# Patient Record
Sex: MALE | Race: WHITE | Employment: OTHER | URBAN - METROPOLITAN AREA
[De-identification: names, ages, dates, MRNs, and addresses within clinical notes are randomized per-mention and may not be internally consistent; named-entity substitution may affect disease eponyms.]

---

## 2018-12-18 ENCOUNTER — OFFICE VISIT (OUTPATIENT)
Dept: INTERNAL MEDICINE CLINIC | Age: 63
End: 2018-12-18
Payer: COMMERCIAL

## 2018-12-18 VITALS
HEART RATE: 72 BPM | WEIGHT: 282 LBS | BODY MASS INDEX: 37.21 KG/M2 | SYSTOLIC BLOOD PRESSURE: 112 MMHG | DIASTOLIC BLOOD PRESSURE: 76 MMHG | OXYGEN SATURATION: 98 %

## 2018-12-18 DIAGNOSIS — K57.30 DIVERTICULOSIS OF LARGE INTESTINE WITHOUT HEMORRHAGE: ICD-10-CM

## 2018-12-18 DIAGNOSIS — Z00.00 ANNUAL PHYSICAL EXAM: Primary | ICD-10-CM

## 2018-12-18 DIAGNOSIS — Z98.84 OBESITY SURGERY STATUS: ICD-10-CM

## 2018-12-18 PROCEDURE — 81002 URINALYSIS NONAUTO W/O SCOPE: CPT | Performed by: INTERNAL MEDICINE

## 2018-12-18 PROCEDURE — 99396 PREV VISIT EST AGE 40-64: CPT | Performed by: INTERNAL MEDICINE

## 2018-12-18 ASSESSMENT — ENCOUNTER SYMPTOMS
EYES NEGATIVE: 1
GASTROINTESTINAL NEGATIVE: 1
RESPIRATORY NEGATIVE: 1

## 2018-12-18 ASSESSMENT — PATIENT HEALTH QUESTIONNAIRE - PHQ9
SUM OF ALL RESPONSES TO PHQ QUESTIONS 1-9: 0
2. FEELING DOWN, DEPRESSED OR HOPELESS: 0
SUM OF ALL RESPONSES TO PHQ QUESTIONS 1-9: 0
1. LITTLE INTEREST OR PLEASURE IN DOING THINGS: 0
DEPRESSION UNABLE TO ASSESS: FUNCTIONAL CAPACITY MOTIVATION LIMITS ACCURACY
SUM OF ALL RESPONSES TO PHQ9 QUESTIONS 1 & 2: 0

## 2018-12-19 ENCOUNTER — NURSE ONLY (OUTPATIENT)
Dept: INTERNAL MEDICINE CLINIC | Age: 63
End: 2018-12-19
Payer: COMMERCIAL

## 2018-12-19 DIAGNOSIS — Z00.00 ANNUAL PHYSICAL EXAM: ICD-10-CM

## 2018-12-19 LAB
A/G RATIO: 1.6 (ref 1.1–2.2)
ALBUMIN SERPL-MCNC: 4.2 G/DL (ref 3.4–5)
ALP BLD-CCNC: 89 U/L (ref 40–129)
ALT SERPL-CCNC: 15 U/L (ref 10–40)
ANION GAP SERPL CALCULATED.3IONS-SCNC: 10 MMOL/L (ref 3–16)
AST SERPL-CCNC: 20 U/L (ref 15–37)
BASOPHILS ABSOLUTE: 0.1 K/UL (ref 0–0.2)
BASOPHILS RELATIVE PERCENT: 1.9 %
BILIRUB SERPL-MCNC: 0.7 MG/DL (ref 0–1)
BUN BLDV-MCNC: 10 MG/DL (ref 7–20)
CALCIUM SERPL-MCNC: 9.5 MG/DL (ref 8.3–10.6)
CHLORIDE BLD-SCNC: 107 MMOL/L (ref 99–110)
CHOLESTEROL, TOTAL: 181 MG/DL (ref 0–199)
CO2: 26 MMOL/L (ref 21–32)
CREAT SERPL-MCNC: 0.8 MG/DL (ref 0.8–1.3)
EOSINOPHILS ABSOLUTE: 0.2 K/UL (ref 0–0.6)
EOSINOPHILS RELATIVE PERCENT: 3.8 %
GFR AFRICAN AMERICAN: >60
GFR NON-AFRICAN AMERICAN: >60
GLOBULIN: 2.6 G/DL
GLUCOSE BLD-MCNC: 89 MG/DL (ref 70–99)
HCT VFR BLD CALC: 44 % (ref 40.5–52.5)
HDLC SERPL-MCNC: 50 MG/DL (ref 40–60)
HEMOGLOBIN: 14.9 G/DL (ref 13.5–17.5)
LDL CHOLESTEROL CALCULATED: 118 MG/DL
LYMPHOCYTES ABSOLUTE: 1.8 K/UL (ref 1–5.1)
LYMPHOCYTES RELATIVE PERCENT: 34.9 %
MCH RBC QN AUTO: 32 PG (ref 26–34)
MCHC RBC AUTO-ENTMCNC: 33.9 G/DL (ref 31–36)
MCV RBC AUTO: 94.4 FL (ref 80–100)
MONOCYTES ABSOLUTE: 0.5 K/UL (ref 0–1.3)
MONOCYTES RELATIVE PERCENT: 9.9 %
NEUTROPHILS ABSOLUTE: 2.6 K/UL (ref 1.7–7.7)
NEUTROPHILS RELATIVE PERCENT: 49.5 %
PDW BLD-RTO: 13.3 % (ref 12.4–15.4)
PLATELET # BLD: 239 K/UL (ref 135–450)
PMV BLD AUTO: 7.3 FL (ref 5–10.5)
POTASSIUM SERPL-SCNC: 4.4 MMOL/L (ref 3.5–5.1)
PROSTATE SPECIFIC ANTIGEN: 1.15 NG/ML (ref 0–4)
RBC # BLD: 4.66 M/UL (ref 4.2–5.9)
SODIUM BLD-SCNC: 143 MMOL/L (ref 136–145)
TOTAL PROTEIN: 6.8 G/DL (ref 6.4–8.2)
TRIGL SERPL-MCNC: 67 MG/DL (ref 0–150)
VLDLC SERPL CALC-MCNC: 13 MG/DL
WBC # BLD: 5.2 K/UL (ref 4–11)

## 2018-12-19 PROCEDURE — 36415 COLL VENOUS BLD VENIPUNCTURE: CPT | Performed by: INTERNAL MEDICINE

## 2020-01-20 ENCOUNTER — OFFICE VISIT (OUTPATIENT)
Dept: INTERNAL MEDICINE CLINIC | Age: 65
End: 2020-01-20
Payer: MEDICARE

## 2020-01-20 VITALS
RESPIRATION RATE: 18 BRPM | HEART RATE: 76 BPM | DIASTOLIC BLOOD PRESSURE: 78 MMHG | TEMPERATURE: 98.1 F | OXYGEN SATURATION: 99 % | WEIGHT: 265 LBS | HEIGHT: 73 IN | BODY MASS INDEX: 35.12 KG/M2 | SYSTOLIC BLOOD PRESSURE: 122 MMHG

## 2020-01-20 LAB
A/G RATIO: 1.6 (ref 1.1–2.2)
ALBUMIN SERPL-MCNC: 4.5 G/DL (ref 3.4–5)
ALP BLD-CCNC: 85 U/L (ref 40–129)
ALT SERPL-CCNC: 18 U/L (ref 10–40)
ANION GAP SERPL CALCULATED.3IONS-SCNC: 14 MMOL/L (ref 3–16)
AST SERPL-CCNC: 22 U/L (ref 15–37)
BASOPHILS ABSOLUTE: 0.1 K/UL (ref 0–0.2)
BASOPHILS RELATIVE PERCENT: 2.2 %
BILIRUB SERPL-MCNC: 0.8 MG/DL (ref 0–1)
BUN BLDV-MCNC: 12 MG/DL (ref 7–20)
CALCIUM SERPL-MCNC: 9.8 MG/DL (ref 8.3–10.6)
CHLORIDE BLD-SCNC: 104 MMOL/L (ref 99–110)
CHOLESTEROL, TOTAL: 192 MG/DL (ref 0–199)
CO2: 23 MMOL/L (ref 21–32)
CREAT SERPL-MCNC: 0.8 MG/DL (ref 0.8–1.3)
EOSINOPHILS ABSOLUTE: 0.2 K/UL (ref 0–0.6)
EOSINOPHILS RELATIVE PERCENT: 3.7 %
GFR AFRICAN AMERICAN: >60
GFR NON-AFRICAN AMERICAN: >60
GLOBULIN: 2.8 G/DL
GLUCOSE BLD-MCNC: 93 MG/DL (ref 70–99)
HCT VFR BLD CALC: 43.9 % (ref 40.5–52.5)
HDLC SERPL-MCNC: 58 MG/DL (ref 40–60)
HEMOGLOBIN: 15 G/DL (ref 13.5–17.5)
LDL CHOLESTEROL CALCULATED: 120 MG/DL
LYMPHOCYTES ABSOLUTE: 1.6 K/UL (ref 1–5.1)
LYMPHOCYTES RELATIVE PERCENT: 32.6 %
MCH RBC QN AUTO: 33 PG (ref 26–34)
MCHC RBC AUTO-ENTMCNC: 34.2 G/DL (ref 31–36)
MCV RBC AUTO: 96.4 FL (ref 80–100)
MONOCYTES ABSOLUTE: 0.4 K/UL (ref 0–1.3)
MONOCYTES RELATIVE PERCENT: 8.8 %
NEUTROPHILS ABSOLUTE: 2.6 K/UL (ref 1.7–7.7)
NEUTROPHILS RELATIVE PERCENT: 52.7 %
PDW BLD-RTO: 13.3 % (ref 12.4–15.4)
PLATELET # BLD: 246 K/UL (ref 135–450)
PMV BLD AUTO: 7.4 FL (ref 5–10.5)
POTASSIUM SERPL-SCNC: 4.9 MMOL/L (ref 3.5–5.1)
PROSTATE SPECIFIC ANTIGEN: 1.49 NG/ML (ref 0–4)
RBC # BLD: 4.56 M/UL (ref 4.2–5.9)
SODIUM BLD-SCNC: 141 MMOL/L (ref 136–145)
TOTAL PROTEIN: 7.3 G/DL (ref 6.4–8.2)
TRIGL SERPL-MCNC: 70 MG/DL (ref 0–150)
VLDLC SERPL CALC-MCNC: 14 MG/DL
WBC # BLD: 5 K/UL (ref 4–11)

## 2020-01-20 PROCEDURE — 3017F COLORECTAL CA SCREEN DOC REV: CPT | Performed by: INTERNAL MEDICINE

## 2020-01-20 PROCEDURE — G8427 DOCREV CUR MEDS BY ELIG CLIN: HCPCS | Performed by: INTERNAL MEDICINE

## 2020-01-20 PROCEDURE — 99214 OFFICE O/P EST MOD 30 MIN: CPT | Performed by: INTERNAL MEDICINE

## 2020-01-20 PROCEDURE — G8417 CALC BMI ABV UP PARAM F/U: HCPCS | Performed by: INTERNAL MEDICINE

## 2020-01-20 PROCEDURE — G8484 FLU IMMUNIZE NO ADMIN: HCPCS | Performed by: INTERNAL MEDICINE

## 2020-01-20 PROCEDURE — 4040F PNEUMOC VAC/ADMIN/RCVD: CPT | Performed by: INTERNAL MEDICINE

## 2020-01-20 PROCEDURE — 1123F ACP DISCUSS/DSCN MKR DOCD: CPT | Performed by: INTERNAL MEDICINE

## 2020-01-20 PROCEDURE — 1036F TOBACCO NON-USER: CPT | Performed by: INTERNAL MEDICINE

## 2020-01-20 PROCEDURE — 36415 COLL VENOUS BLD VENIPUNCTURE: CPT | Performed by: INTERNAL MEDICINE

## 2020-01-20 SDOH — ECONOMIC STABILITY: FOOD INSECURITY: WITHIN THE PAST 12 MONTHS, THE FOOD YOU BOUGHT JUST DIDN'T LAST AND YOU DIDN'T HAVE MONEY TO GET MORE.: NEVER TRUE

## 2020-01-20 SDOH — ECONOMIC STABILITY: TRANSPORTATION INSECURITY
IN THE PAST 12 MONTHS, HAS THE LACK OF TRANSPORTATION KEPT YOU FROM MEDICAL APPOINTMENTS OR FROM GETTING MEDICATIONS?: NO

## 2020-01-20 SDOH — ECONOMIC STABILITY: TRANSPORTATION INSECURITY
IN THE PAST 12 MONTHS, HAS LACK OF TRANSPORTATION KEPT YOU FROM MEETINGS, WORK, OR FROM GETTING THINGS NEEDED FOR DAILY LIVING?: NO

## 2020-01-20 SDOH — ECONOMIC STABILITY: FOOD INSECURITY: WITHIN THE PAST 12 MONTHS, YOU WORRIED THAT YOUR FOOD WOULD RUN OUT BEFORE YOU GOT MONEY TO BUY MORE.: NEVER TRUE

## 2020-01-20 SDOH — ECONOMIC STABILITY: INCOME INSECURITY: HOW HARD IS IT FOR YOU TO PAY FOR THE VERY BASICS LIKE FOOD, HOUSING, MEDICAL CARE, AND HEATING?: NOT HARD AT ALL

## 2020-01-20 ASSESSMENT — ENCOUNTER SYMPTOMS
RESPIRATORY NEGATIVE: 1
GASTROINTESTINAL NEGATIVE: 1

## 2020-01-20 ASSESSMENT — PATIENT HEALTH QUESTIONNAIRE - PHQ9
SUM OF ALL RESPONSES TO PHQ QUESTIONS 1-9: 0
SUM OF ALL RESPONSES TO PHQ9 QUESTIONS 1 & 2: 0
SUM OF ALL RESPONSES TO PHQ QUESTIONS 1-9: 0
2. FEELING DOWN, DEPRESSED OR HOPELESS: 0
1. LITTLE INTEREST OR PLEASURE IN DOING THINGS: 0

## 2020-01-20 NOTE — PROGRESS NOTES
Subjective:      Patient ID: Dipesh Tariq is a 71 y.o. male. HPI   Gertrudis Schwartz is here for an annual exam. Overall he is feeling well. Past Surgical History:   Procedure Laterality Date    GASTRIC RESTRICTION SURGERY  5/12    sleeve       No current outpatient medications on file. No current facility-administered medications for this visit. Social History     Socioeconomic History    Marital status:      Spouse name: Not on file    Number of children: Not on file    Years of education: Not on file    Highest education level: Not on file   Occupational History    Not on file   Social Needs    Financial resource strain: Not hard at all   Meldium insecurity:     Worry: Never true     Inability: Never true   [place]itesprite needs:     Medical: No     Non-medical: No   Tobacco Use    Smoking status: Never Smoker    Smokeless tobacco: Never Used   Substance and Sexual Activity    Alcohol use: Not on file    Drug use: Not on file    Sexual activity: Not on file   Lifestyle    Physical activity:     Days per week: Not on file     Minutes per session: Not on file    Stress: Not on file   Relationships    Social connections:     Talks on phone: Not on file     Gets together: Not on file     Attends Muslim service: Not on file     Active member of club or organization: Not on file     Attends meetings of clubs or organizations: Not on file     Relationship status: Not on file    Intimate partner violence:     Fear of current or ex partner: Not on file     Emotionally abused: Not on file     Physically abused: Not on file     Forced sexual activity: Not on file   Other Topics Concern    Not on file   Social History Narrative    Not on file       Family History   Problem Relation Age of Onset    Other Mother         MVA    Heart Failure Father     Other Brother         Prostate cancer       Review of Systems   Constitutional: Negative. HENT: Negative.     Eyes: Negative for

## 2020-07-09 ENCOUNTER — OFFICE VISIT (OUTPATIENT)
Dept: PRIMARY CARE CLINIC | Age: 65
End: 2020-07-09
Payer: MEDICARE

## 2020-07-09 PROCEDURE — G8417 CALC BMI ABV UP PARAM F/U: HCPCS | Performed by: NURSE PRACTITIONER

## 2020-07-09 PROCEDURE — 99211 OFF/OP EST MAY X REQ PHY/QHP: CPT | Performed by: NURSE PRACTITIONER

## 2020-07-09 PROCEDURE — G8428 CUR MEDS NOT DOCUMENT: HCPCS | Performed by: NURSE PRACTITIONER

## 2020-07-09 NOTE — PROGRESS NOTES
Karina Monroe received a viral test for COVID-19. They were educated on isolation and quarantine as appropriate. For any symptoms, they were directed to seek care from their PCP, given contact information to establish with a doctor, directed to an urgent care or the emergency room.

## 2020-07-13 LAB — SARS-COV-2: NOT DETECTED

## 2021-02-16 ENCOUNTER — OFFICE VISIT (OUTPATIENT)
Dept: INTERNAL MEDICINE CLINIC | Age: 66
End: 2021-02-16
Payer: MEDICARE

## 2021-02-16 VITALS
SYSTOLIC BLOOD PRESSURE: 134 MMHG | OXYGEN SATURATION: 98 % | HEIGHT: 72 IN | HEART RATE: 81 BPM | WEIGHT: 281 LBS | TEMPERATURE: 98.1 F | BODY MASS INDEX: 38.06 KG/M2 | RESPIRATION RATE: 12 BRPM | DIASTOLIC BLOOD PRESSURE: 84 MMHG

## 2021-02-16 DIAGNOSIS — Z13.29 SCREENING FOR HYPOTHYROIDISM: ICD-10-CM

## 2021-02-16 DIAGNOSIS — Z12.5 SCREENING PSA (PROSTATE SPECIFIC ANTIGEN): ICD-10-CM

## 2021-02-16 DIAGNOSIS — Z13.220 SCREENING FOR HYPERLIPIDEMIA: ICD-10-CM

## 2021-02-16 DIAGNOSIS — Z13.1 SCREENING FOR DIABETES MELLITUS: ICD-10-CM

## 2021-02-16 DIAGNOSIS — Z76.89 ENCOUNTER TO ESTABLISH CARE: Primary | ICD-10-CM

## 2021-02-16 DIAGNOSIS — R03.0 ELEVATED BLOOD PRESSURE READING: ICD-10-CM

## 2021-02-16 DIAGNOSIS — E66.09 CLASS 2 OBESITY DUE TO EXCESS CALORIES WITHOUT SERIOUS COMORBIDITY WITH BODY MASS INDEX (BMI) OF 35.0 TO 35.9 IN ADULT: ICD-10-CM

## 2021-02-16 DIAGNOSIS — Z00.00 ROUTINE GENERAL MEDICAL EXAMINATION AT A HEALTH CARE FACILITY: Primary | ICD-10-CM

## 2021-02-16 DIAGNOSIS — Z12.11 SCREENING FOR COLON CANCER: ICD-10-CM

## 2021-02-16 PROBLEM — E66.812 CLASS 2 OBESITY DUE TO EXCESS CALORIES WITHOUT SERIOUS COMORBIDITY IN ADULT: Status: ACTIVE | Noted: 2021-02-16

## 2021-02-16 PROCEDURE — G8484 FLU IMMUNIZE NO ADMIN: HCPCS | Performed by: INTERNAL MEDICINE

## 2021-02-16 PROCEDURE — 4040F PNEUMOC VAC/ADMIN/RCVD: CPT | Performed by: INTERNAL MEDICINE

## 2021-02-16 PROCEDURE — 1123F ACP DISCUSS/DSCN MKR DOCD: CPT | Performed by: INTERNAL MEDICINE

## 2021-02-16 PROCEDURE — 1036F TOBACCO NON-USER: CPT | Performed by: INTERNAL MEDICINE

## 2021-02-16 PROCEDURE — G8427 DOCREV CUR MEDS BY ELIG CLIN: HCPCS | Performed by: INTERNAL MEDICINE

## 2021-02-16 PROCEDURE — G0438 PPPS, INITIAL VISIT: HCPCS | Performed by: INTERNAL MEDICINE

## 2021-02-16 PROCEDURE — G8417 CALC BMI ABV UP PARAM F/U: HCPCS | Performed by: INTERNAL MEDICINE

## 2021-02-16 PROCEDURE — 99214 OFFICE O/P EST MOD 30 MIN: CPT | Performed by: INTERNAL MEDICINE

## 2021-02-16 PROCEDURE — 3017F COLORECTAL CA SCREEN DOC REV: CPT | Performed by: INTERNAL MEDICINE

## 2021-02-16 ASSESSMENT — ENCOUNTER SYMPTOMS
SORE THROAT: 0
BACK PAIN: 0
COUGH: 0
BLOOD IN STOOL: 0
VOMITING: 0
WHEEZING: 0
CHEST TIGHTNESS: 0
ABDOMINAL PAIN: 0
SHORTNESS OF BREATH: 0
COLOR CHANGE: 0
EYE DISCHARGE: 0
NAUSEA: 0
DIARRHEA: 0
ABDOMINAL DISTENTION: 0
CONSTIPATION: 0

## 2021-02-16 ASSESSMENT — LIFESTYLE VARIABLES
HOW MANY STANDARD DRINKS CONTAINING ALCOHOL DO YOU HAVE ON A TYPICAL DAY: 0
HOW OFTEN DO YOU HAVE SIX OR MORE DRINKS ON ONE OCCASION: 0
AUDIT-C TOTAL SCORE: 2

## 2021-02-16 ASSESSMENT — PATIENT HEALTH QUESTIONNAIRE - PHQ9
SUM OF ALL RESPONSES TO PHQ QUESTIONS 1-9: 0
SUM OF ALL RESPONSES TO PHQ QUESTIONS 1-9: 0
SUM OF ALL RESPONSES TO PHQ9 QUESTIONS 1 & 2: 0
2. FEELING DOWN, DEPRESSED OR HOPELESS: 0

## 2021-02-16 NOTE — PROGRESS NOTES
2021    Melva Mayfield (:  1955) is a 76 y.o. male, here for evaluation of the following medical concerns:    Chief Complaint   Patient presents with    Established New Doctor     PATIENT IS NOT FASTING        HPI  The pt is a 72YOF with PMH of obesity who presents to establish care and for preventive visit. He does not smoke and drinks wine twice a week. He is living at home  Obesity  The pt has been overweight since he was 31. He had gastric sleeve done and lost weight. He tries to stay away from sugar and eat three small meals a day and works out for an hour Elpitical and cycling classes. No issue with snoring. He stays away form sugar and eats small meals. He has gained a few more Lbs at his last visit. He is not interested in going to the weight management at this time. He says he will notify the office is he finds out that his blood pressure is high at home  Elevated blood pressure reading  He says he has not had any high blood pressure. He does not check it at home. No headache, lightheadedness and dizziness. No  and SOB, no blurry vision. Review of Systems   Constitutional: Negative for activity change, appetite change, chills, fatigue and fever. HENT: Negative for congestion, ear pain, mouth sores, nosebleeds, sneezing and sore throat. Eyes: Negative for discharge and visual disturbance. Respiratory: Negative for cough, chest tightness, shortness of breath and wheezing. Cardiovascular: Negative for chest pain, palpitations and leg swelling. Gastrointestinal: Negative for abdominal distention, abdominal pain, blood in stool, constipation, diarrhea, nausea and vomiting. Endocrine: Negative for polydipsia, polyphagia and polyuria. Genitourinary: Negative for difficulty urinating and frequency. Musculoskeletal: Negative for arthralgias, back pain, gait problem, myalgias, neck pain and neck stiffness. Skin: Negative for color change, pallor, rash and wound. Neurological: Negative for dizziness, facial asymmetry, speech difficulty, weakness, numbness and headaches. Hematological: Negative for adenopathy. Psychiatric/Behavioral: Negative for agitation, confusion, dysphoric mood, self-injury and suicidal ideas. All other systems reviewed and are negative. Prior to Visit Medications    Not on File        No Known Allergies    Past Medical History:   Diagnosis Date    Colonic polyp     Diverticulosis     RBBB     2010       Past Surgical History:   Procedure Laterality Date    GASTRIC RESTRICTION SURGERY  5/12    sleeve       Social History     Tobacco Use    Smoking status: Never Smoker    Smokeless tobacco: Never Used   Substance Use Topics    Alcohol use: Not on file    Drug use: Not on file         Family History   Problem Relation Age of Onset    Other Mother         MVA    Heart Failure Father     Other Brother         Prostate cancer       Vitals:    02/16/21 1323   BP: 134/84   Site: Left Upper Arm   Position: Sitting   Cuff Size: Large Adult   Pulse: 81   Resp: 12   Temp: 98.1 °F (36.7 °C)   TempSrc: Infrared   SpO2: 98%   Weight: 281 lb (127.5 kg)  Comment: SHOES ON - ABS   Height: 6' (1.829 m)  Comment: SHOES ON - ABS       Estimated body mass index is 38.11 kg/m² as calculated from the following:    Height as of this encounter: 6' (1.829 m). Weight as of this encounter: 281 lb (127.5 kg). Physical Exam  Vitals signs and nursing note reviewed. Constitutional:       Appearance: Normal appearance. He is obese. HENT:      Head: Normocephalic and atraumatic. Right Ear: Tympanic membrane and ear canal normal.      Left Ear: Tympanic membrane and ear canal normal.      Nose: Nose normal.      Mouth/Throat:      Mouth: Mucous membranes are moist.      Pharynx: Oropharynx is clear. Eyes:      Extraocular Movements: Extraocular movements intact. Pupils: Pupils are equal, round, and reactive to light.    Neck: Musculoskeletal: Normal range of motion. No neck rigidity or muscular tenderness. Cardiovascular:      Rate and Rhythm: Normal rate and regular rhythm. Pulses: Normal pulses. Pulmonary:      Effort: Pulmonary effort is normal.      Breath sounds: Normal breath sounds. No wheezing or rhonchi. Abdominal:      General: Abdomen is flat. There is no distension. Palpations: Abdomen is soft. There is no mass. Tenderness: There is no abdominal tenderness. Musculoskeletal: Normal range of motion. General: Tenderness present. No swelling. Skin:     General: Skin is warm and dry. Neurological:      General: No focal deficit present. Mental Status: He is alert and oriented to person, place, and time. Sensory: No sensory deficit. Motor: No weakness. Psychiatric:         Mood and Affect: Mood normal.         Behavior: Behavior normal.         Thought Content: Thought content normal.         Judgment: Judgment normal.         ASSESSMENT/PLAN:  1. Encounter to establish care  Will continue to follow    2. Class 2 obesity due to excess calories without serious comorbidity with body mass index (BMI) of 35.0 to 35.9 in adult  Counseling performed    3. Elevated blood pressure reading  Pt told to check his blood pressure at home. Will likely need a medications if it stays high  The pt was seen and examined and plan of care discussed. The visit time was 35 with more then 50% of the time was spent on counseling the pt regarding diet and life style changes, discussing options for wt loss and coordinating care. No orders of the defined types were placed in this encounter. Return in about 1 year (around 2/16/2022) for AWV.

## 2021-02-16 NOTE — PROGRESS NOTES
Medicare Annual Wellness Visit  Name: Juan Fragoso Date: 2021   MRN: <[id number]> Sex: Male   Age: 76 y.o. Ethnicity: Non-/Non    : 1955 Race: Minerva Dos Santos is here for Medicare AWV    Screenings for behavioral, psychosocial and functional/safety risks, and cognitive dysfunction are all negative except as indicated below. These results, as well as other patient data from the [address] form, are documented in Flowsheets linked to this Encounter. Colonoscopy: due     No Known Allergies      Prior to Visit Medications    Not on File         Past Medical History:   Diagnosis Date    Colonic polyp     Diverticulosis     RBBB            Past Surgical History:   Procedure Laterality Date    GASTRIC RESTRICTION SURGERY      sleeve         Family History   Problem Relation Age of Onset    Other Mother         MVA    Heart Failure Father     Other Brother         Prostate cancer       CareTeam (Including outside providers/suppliers regularly involved in providing care):   Patient Care Team:  Alayna Thompson MD as PCP - General (Internal Medicine)  PHU Terry CNP as PCP - [facility] Empaneled Provider    Wt Readings from Last 3 Encounters:   21 281 lb (127.5 kg)   20 265 lb (120.2 kg)   18 282 lb (127.9 kg)     There were no vitals filed for this visit. There is no height or weight on file to calculate BMI. Based upon direct observation of the patient, evaluation of cognition reveals recent and remote memory intact.     General Appearance: alert and oriented to person, place and time, well developed and well- nourished, in no acute distress  Skin: warm and dry, no rash or erythema  Head: normocephalic and atraumatic  Eyes: pupils equal, round, and reactive to light, extraocular eye movements intact, conjunctivae normal ENT: tympanic membrane, external ear and ear canal normal bilaterally, nose without deformity, nasal mucosa and turbinates normal without polyps  Neck: supple and non-tender without mass, no thyromegaly or thyroid nodules, no cervical lymphadenopathy  Pulmonary/Chest: clear to auscultation bilaterally- no wheezes, rales or rhonchi, normal air movement, no respiratory distress  Cardiovascular: normal rate, regular rhythm, normal S1 and S2, no murmurs, rubs, clicks, or gallops, distal pulses intact, no carotid bruits  Abdomen: soft, non-tender, non-distended, normal bowel sounds, no masses or organomegaly  Extremities: no cyanosis, clubbing or edema  Musculoskeletal: normal range of motion, no joint swelling, deformity or tenderness  Neurologic: no cranial nerve deficit, gait, coordination and speech normal    Patient's complete Health Risk Assessment and screening values have been reviewed and are found in Flowsheets. The following problems were reviewed today and where indicated follow up appointments were made and/or referrals ordered. Positive Risk Factor Screenings with Interventions:            General Health and ACP:  General  In general, how would you say your health is?: Very Good  In the past 7 days, have you experienced any of the following?  New or Increased Pain, New or Increased Fatigue, Loneliness, Social Isolation, Stress or Anger?: None of These  Do you get the social and emotional support that you need?: Yes  Do you have a Living Will?: Yes  Advance Directives     Power of [address] [place] [address] Will ACP-Advance Directive ACP-Power of     Not on File Not on File Not on File Not on File      General Health Risk Interventions:  · no specific problems identified    Health Habits/Nutrition:  Health Habits/Nutrition  Do you exercise for at least 20 minutes 2-3 times per week?: Yes  Have you lost any weight without trying in the past 3 months?: No  Do you eat only one meal per day?: No Have you seen the dentist within the past year?: Appointment is scheduled     Health Habits/Nutrition Interventions:  · Dental exam overdue:  patient encouraged to make appointment with his/her dentist     Safety:  Safety  Do you have working smoke detectors?: Yes  Have all throw rugs been removed or fastened?: Yes  Do you have non-slip mats or surfaces in all bathtubs/showers?: (!) No(patient states not needed in their home)  Do all of your stairways have a railing or banister?: Yes  Are your doorways, halls and stairs free of clutter?: Yes  Do you always fasten your seatbelt when you are in a car?: Yes  Safety Interventions:  · Home safety tips provided     Personalized Preventive Plan   Current Health Maintenance Status  Immunization History   Administered Date(s) Administered    COVID-19Elton, 30mcg/0.3ml Dose 02/11/2021        Health Maintenance   Topic Date Due    Pneumococcal 65+ years Vaccine (1 of 1 - PPSV23) 09/11/2019    Annual Wellness Visit (AWV)  01/20/2020    Flu vaccine (1) 09/01/2020    DTaP/Tdap/Td vaccine (1 - Tdap) 02/16/2022 (Originally 9/11/1973)    Shingles Vaccine (1 of 2) 02/16/2022 (Originally 9/11/2004)    Hepatitis C screen  02/16/2022 (Originally 1955)    COVID-19 Vaccine (2 of 2 - Pfizer series) 03/04/2021    Colon cancer screen colonoscopy  09/27/2021    Lipid screen  01/20/2025    Hepatitis A vaccine  Aged Out    Hepatitis B vaccine  Aged Out    Hib vaccine  Aged Out    Meningococcal (ACWY) vaccine  Aged Out     Recommendations for [place] Due: see orders and patient instructions/AVS.  . Recommended screening schedule for the next 5-10 years is provided to the patient in written form: see Patient Instructions/AVS.    Claire Jauregui was seen today for medicare awv.     Diagnoses and all orders for this visit:    Routine general medical examination at a health care facility    Screening for colon cancer -     Cancel: Nicanor Rene MD, Gastroenterology, Gisela Craig MD, Gastroenterology, [facility]    Screening PSA (prostate specific antigen)  -     Cancel: PSA, Prostatic Specific Antigen; Future  -     PSA, Prostatic Specific Antigen; Future    Screening for diabetes mellitus  -     Cancel: HEMOGLOBIN A1C; Future  -     HEMOGLOBIN A1C; Future    Screening for hyperlipidemia  -     Cancel: Lipid, Fasting; Future  -     Lipid, Fasting; Future    Screening for hypothyroidism  -     Cancel: COMPREHENSIVE METABOLIC PANEL; Future  -     Cancel: TSH WITH REFLEX TO FT4; Future  -     Cancel: CBC Auto Differential; Future  -     COMPREHENSIVE METABOLIC PANEL;  Future  -     CBC Auto Differential; Future  -     TSH WITH REFLEX TO FT4; Future

## 2021-02-18 ENCOUNTER — NURSE ONLY (OUTPATIENT)
Dept: INTERNAL MEDICINE CLINIC | Age: 66
End: 2021-02-18
Payer: MEDICARE

## 2021-02-18 DIAGNOSIS — Z13.29 SCREENING FOR HYPOTHYROIDISM: ICD-10-CM

## 2021-02-18 DIAGNOSIS — Z13.1 SCREENING FOR DIABETES MELLITUS: ICD-10-CM

## 2021-02-18 DIAGNOSIS — Z12.5 SCREENING PSA (PROSTATE SPECIFIC ANTIGEN): ICD-10-CM

## 2021-02-18 DIAGNOSIS — Z13.220 SCREENING FOR HYPERLIPIDEMIA: ICD-10-CM

## 2021-02-18 LAB
A/G RATIO: 1.8 (ref 1.1–2.2)
ALBUMIN SERPL-MCNC: 4.3 G/DL (ref 3.4–5)
ALP BLD-CCNC: 84 U/L (ref 40–129)
ALT SERPL-CCNC: 19 U/L (ref 10–40)
ANION GAP SERPL CALCULATED.3IONS-SCNC: 10 MMOL/L (ref 3–16)
AST SERPL-CCNC: 23 U/L (ref 15–37)
BASOPHILS ABSOLUTE: 0.1 K/UL (ref 0–0.2)
BASOPHILS RELATIVE PERCENT: 1.9 %
BILIRUB SERPL-MCNC: 0.9 MG/DL (ref 0–1)
BUN BLDV-MCNC: 10 MG/DL (ref 7–20)
CALCIUM SERPL-MCNC: 9.7 MG/DL (ref 8.3–10.6)
CHLORIDE BLD-SCNC: 104 MMOL/L (ref 99–110)
CHOLESTEROL, FASTING: 185 MG/DL (ref 0–199)
CO2: 25 MMOL/L (ref 21–32)
CREAT SERPL-MCNC: 0.8 MG/DL (ref 0.8–1.3)
EOSINOPHILS ABSOLUTE: 0.2 K/UL (ref 0–0.6)
EOSINOPHILS RELATIVE PERCENT: 3.9 %
GFR AFRICAN AMERICAN: >60
GFR NON-AFRICAN AMERICAN: >60
GLOBULIN: 2.4 G/DL
GLUCOSE BLD-MCNC: 97 MG/DL (ref 70–99)
HCT VFR BLD CALC: 42.5 % (ref 40.5–52.5)
HDLC SERPL-MCNC: 59 MG/DL (ref 40–60)
HEMOGLOBIN: 14.7 G/DL (ref 13.5–17.5)
LDL CHOLESTEROL CALCULATED: 112 MG/DL
LYMPHOCYTES ABSOLUTE: 1.8 K/UL (ref 1–5.1)
LYMPHOCYTES RELATIVE PERCENT: 36.2 %
MCH RBC QN AUTO: 33 PG (ref 26–34)
MCHC RBC AUTO-ENTMCNC: 34.7 G/DL (ref 31–36)
MCV RBC AUTO: 95.3 FL (ref 80–100)
MONOCYTES ABSOLUTE: 0.5 K/UL (ref 0–1.3)
MONOCYTES RELATIVE PERCENT: 9.7 %
NEUTROPHILS ABSOLUTE: 2.4 K/UL (ref 1.7–7.7)
NEUTROPHILS RELATIVE PERCENT: 48.3 %
PDW BLD-RTO: 13 % (ref 12.4–15.4)
PLATELET # BLD: 225 K/UL (ref 135–450)
PMV BLD AUTO: 7.6 FL (ref 5–10.5)
POTASSIUM SERPL-SCNC: 4.6 MMOL/L (ref 3.5–5.1)
PROSTATE SPECIFIC ANTIGEN: 1.51 NG/ML (ref 0–4)
RBC # BLD: 4.46 M/UL (ref 4.2–5.9)
SODIUM BLD-SCNC: 139 MMOL/L (ref 136–145)
TOTAL PROTEIN: 6.7 G/DL (ref 6.4–8.2)
TRIGLYCERIDE, FASTING: 69 MG/DL (ref 0–150)
TSH REFLEX FT4: 2.09 UIU/ML (ref 0.27–4.2)
VLDLC SERPL CALC-MCNC: 14 MG/DL
WBC # BLD: 5.1 K/UL (ref 4–11)

## 2021-02-18 PROCEDURE — 36415 COLL VENOUS BLD VENIPUNCTURE: CPT | Performed by: INTERNAL MEDICINE

## 2021-02-19 LAB
ESTIMATED AVERAGE GLUCOSE: 99.7 MG/DL
HBA1C MFR BLD: 5.1 %

## 2021-02-20 RX ORDER — ATORVASTATIN CALCIUM 20 MG/1
20 TABLET, FILM COATED ORAL DAILY
Qty: 30 TABLET | Refills: 3 | Status: SHIPPED | OUTPATIENT
Start: 2021-02-20 | End: 2021-06-28

## 2021-02-20 RX ORDER — ASPIRIN 81 MG/1
81 TABLET ORAL DAILY
Qty: 90 TABLET | Refills: 1 | Status: SHIPPED | OUTPATIENT
Start: 2021-02-20

## 2021-09-22 NOTE — PROGRESS NOTES
[address] [facility] time__9/29/21 0615__________        Surgery time_____0700_______    Take the following medications with a sip of water:    Do not eat or drink anything after 12:00 midnight prior to your surgery. This includes water chewing gum, mints and ice chips. You may brush your teeth and gargle the morning of your surgery, but do not swallow the water     Please see your family doctor/pediatrician for a history and physical and/or concerning medications. Bring any test results/reports from your physicians office. If you are under the care of a heart doctor or specialist doctor, please be aware that you may be asked to them for clearance    You may be asked to stop blood thinners such as Coumadin, Plavix, Fragmin, Lovenox, etc., or any anti-inflammatories such as:  Aspirin, Ibuprofen, Advil, Naproxen prior to your surgery. We also ask that you stop any OTC medications such as fish oil, vitamin E, glucosamine, garlic, Multivitamins, COQ 10, etc.    We ask that you do not smoke 24 hours prior to surgery  We ask that you do not  drink any alcoholic beverages 24 hours prior to surgery     You must make arrangements for a responsible adult to take you home after your surgery. For your safety you will not be allowed to leave alone or drive yourself home. Your surgery will be cancelled if you do not have a ride home. Also for your safety, it is strongly suggested that someone stay with you the first 24 hours after your surgery. A parent or legal guardian must accompany a child scheduled for surgery and plan to stay at the hospital until the child is discharged. Please do not bring other children with you. For your comfort, please wear simple loose fitting clothing to the hospital.  Please do not bring valuables.     Do not wear any make-up or nail polish on your fingers or toes      For your safety, please do not wear any jewelry or body piercing's on the day of surgery. All jewelry must be removed. If you have dentures, they will be removed before going to operating room. For your convenience, we will provide you with a container. If you wear contact lenses or glasses, they will be removed, please bring a case for them. If you have a living will and a durable power of  for healthcare, please bring in a copy. As part of our patient safety program to minimize surgical site infections, we ask you to do the following:    · Please notify your surgeon if you develop any illness between         now and the  day of your surgery. · This includes a cough, cold, fever, sore throat, nausea,         or vomiting, and diarrhea, etc.  ·  Please notify your surgeon if you experience dizziness, shortness         of breath or blurred vision between now and the time of your surgery. Do not shave your operative site 96 hours prior to surgery. For face and neck surgery, men may use an electric razor 48 hours   prior to surgery. You may shower the night before surgery or the morning of   your surgery with an antibacterial soap. You will need to bring a photo ID and insurance card    [facility] has an onsite pharmacy, would you like to utilize our pharmacy     If you will be staying overnight and use a C-pap machine, please bring   your C-pap to hospital     Our goal is to provide you with excellent care, therefore, visitors will be limited to two(2) in the room at a time so that we may focus on providing this care for you. Please contact pre-admission testing if you have any further questions. [facility] phone number:  [phone]  Please note these are generalized instructions for all surgical cases, you may be provided with more specific instructions according to your surgery.

## 2021-09-28 ENCOUNTER — ANESTHESIA EVENT (OUTPATIENT)
Dept: ENDOSCOPY | Age: 66
End: 2021-09-28
Payer: MEDICARE

## 2021-09-28 NOTE — ANESTHESIA PRE PROCEDURE
Department of Anesthesiology  Preprocedure Note       Name:  Roseline Stover   Age:  78 y.o.  :  1955                                          MRN:  [id number]         Date:  2021      Surgeon: Sai Haysor):  Erika Trimble MD    Procedure: Procedure(s):  COLORECTAL CANCER SCREENING, NOT HIGH RISK    Medications prior to admission:   Prior to Admission medications    Medication Sig Start Date End Date Taking?  Authorizing Provider   Multiple Vitamins-Minerals (THERAPEUTIC MULTIVITAMIN-MINERALS) tablet Take 1 tablet by mouth daily   Yes Historical Provider, MD   aspirin EC 81 MG EC tablet Take 1 tablet by mouth daily 21  Yes Emily De La Cruz MD       Current medications:    Current Facility-Administered Medications   Medication Dose Route Frequency Provider Last Rate Last Admin    0.9 % sodium chloride infusion   IntraVENous Continuous Gabriela Ruiz MD        sodium chloride flush 0.9 % injection 10 mL  10 mL IntraVENous 2 times per day Baker MD Sara        sodium chloride flush 0.9 % injection 10 mL  10 mL IntraVENous PRN Gabriela Ruiz MD        0.9 % sodium chloride infusion  25 mL IntraVENous PRN Gabriela Ruiz MD           Allergies:  No Known Allergies    Problem List:    Patient Active Problem List   Diagnosis Code    Colonic polyp K63.5    Diverticulosis K57.90    RBBB I45.10    Obesity surgery status Z98.84    Diverticulosis of large intestine without hemorrhage K57.30    Class 2 obesity due to excess calories without serious comorbidity in adult E66.09    Elevated blood pressure reading R03.0       Past Medical History:        Diagnosis Date    Colonic polyp     Diverticulosis     RBBB            Past Surgical History:        Procedure Laterality Date    ACHILLES TENDON SURGERY      COLONOSCOPY      GASTRIC RESTRICTION SURGERY  2012    sleeve       Social History:    Social History     Tobacco Use    Smoking status: Never Smoker    Smokeless tobacco: Never Used   Substance Use Topics    Alcohol use: Yes                                Counseling given: Not Answered      Vital Signs (Current):   Vitals:    09/22/21 1416 09/29/21 0643   BP:  139/88   Pulse:  70   Resp:  16   Temp:  96.7 °F (35.9 °C)   TempSrc:  Temporal   SpO2:  97%   Weight: 281 lb (127.5 kg) 260 lb (117.9 kg)   Height: 6' (1.829 m) 6' (1.829 m)                                              BP Readings from Last 3 Encounters:   09/29/21 139/88   06/28/21 134/82   02/16/21 134/84       NPO Status: Time of last liquid consumption: 0130                        Time of last solid consumption: 2200                        Date of last liquid consumption: 09/29/21                        Date of last solid food consumption: 09/27/21    BMI:   Wt Readings from Last 3 Encounters:   09/29/21 260 lb (117.9 kg)   06/28/21 270 lb 6.4 oz (122.7 kg)   02/16/21 281 lb (127.5 kg)     Body mass index is 35.26 kg/m². CBC:   Lab Results   Component Value Date    WBC 5.1 02/18/2021    RBC 4.46 02/18/2021    HGB 14.7 02/18/2021    HCT 42.5 02/18/2021    MCV 95.3 02/18/2021    RDW 13.0 02/18/2021     02/18/2021       CMP:   Lab Results   Component Value Date     02/18/2021    K 4.6 02/18/2021     02/18/2021    CO2 25 02/18/2021    BUN 10 02/18/2021    CREATININE 0.8 02/18/2021    GFRAA >60 02/18/2021    GFRAA >60 12/18/2012    AGRATIO 1.8 02/18/2021    LABGLOM >60 02/18/2021    GLUCOSE 97 02/18/2021    PROT 6.7 02/18/2021    PROT 6.9 12/18/2012    CALCIUM 9.7 02/18/2021    BILITOT 0.9 02/18/2021    ALKPHOS 84 02/18/2021    AST 23 02/18/2021    ALT 19 02/18/2021       POC Tests: No results for input(s): POCGLU, POCNA, POCK, POCCL, POCBUN, POCHEMO, POCHCT in the last 72 hours.     Coags: No results found for: PROTIME, INR, APTT    HCG (If Applicable): No results found for: PREGTESTUR, PREGSERUM, HCG, HCGQUANT     ABGs: No results found for: PHART, PO2ART, JEG1LOV, SPV3ZQR, BEART, P7BHUBIS     Type & Screen (If Applicable):  No results found for: LABABO, LABRH    Drug/Infectious Status (If Applicable):  No results found for: HIV, HEPCAB    COVID-19 Screening (If Applicable):   Lab Results   Component Value Date    COVID19 NOT DETECTED 07/09/2020           Anesthesia Evaluation   no history of anesthetic complications:   Airway: Mallampati: II  TM distance: >3 FB   Neck ROM: full  Mouth opening: > = 3 FB Dental:          Pulmonary: breath sounds clear to auscultation      (-) COPD, asthma, rhonchi and not a current smoker  Sleep apnea: denies. Cardiovascular:  Exercise tolerance: good (>4 METS),   (+) hypertension (no medications since bariatric surgery):, dysrhythmias (RBBB):, hyperlipidemia    (-) orthopnea,  POZO, weak pulses and peripheral edema      Rhythm: regular  Rate: normal                    Neuro/Psych:      (-) seizures, TIA and CVA            ROS comment: Left knee pain GI/Hepatic/Renal:   (+) bowel prep, morbid obesity (BMI: 38.1 s/p gastric sleeve)     (-) liver disease and no renal disease      ROS comment: Diverticulosis. Endo/Other:        (-) diabetes mellitus (HgbA1c: 5.1%), hypothyroidism, blood dyscrasia               Abdominal:   (+) obese,     Abdomen: soft. Vascular: Other Findings:           Anesthesia Plan      MAC     ASA 3       Induction: intravenous. Anesthetic plan and risks discussed with patient. Plan discussed with CRNA. This pre-anesthesia assessment may be used as a history and physical.    DOS STAFF ADDENDUM:    Pt seen and examined, chart reviewed (including anesthesia, drug and allergy history). No interval changes to history and physical examination. Anesthetic plan, risks, benefits, alternatives, and personnel involved discussed with patient. Patient verbalized an understanding and agrees to proceed.       Priscilla Fiore MD  September 29, 2021  6:49 AM

## 2021-09-29 ENCOUNTER — ANESTHESIA (OUTPATIENT)
Dept: ENDOSCOPY | Age: 66
End: 2021-09-29
Payer: MEDICARE

## 2021-09-29 ENCOUNTER — HOSPITAL ENCOUNTER (OUTPATIENT)
Age: 66
Setting detail: OUTPATIENT SURGERY
Discharge: HOME | End: 2021-09-29
Attending: INTERNAL MEDICINE | Admitting: INTERNAL MEDICINE
Payer: MEDICARE

## 2021-09-29 VITALS
HEIGHT: 72 IN | SYSTOLIC BLOOD PRESSURE: 122 MMHG | HEART RATE: 64 BPM | RESPIRATION RATE: 16 BRPM | WEIGHT: 260 LBS | TEMPERATURE: 96.7 F | BODY MASS INDEX: 35.21 KG/M2 | OXYGEN SATURATION: 97 % | DIASTOLIC BLOOD PRESSURE: 76 MMHG

## 2021-09-29 VITALS — SYSTOLIC BLOOD PRESSURE: 96 MMHG | OXYGEN SATURATION: 96 % | DIASTOLIC BLOOD PRESSURE: 63 MMHG

## 2021-09-29 PROCEDURE — 7100000010 HC PHASE II RECOVERY - FIRST 15 MIN: Performed by: INTERNAL MEDICINE

## 2021-09-29 PROCEDURE — 6360000002 HC RX W HCPCS: Performed by: NURSE ANESTHETIST, CERTIFIED REGISTERED

## 2021-09-29 PROCEDURE — 2580000003 HC RX 258: Performed by: ANESTHESIOLOGY

## 2021-09-29 PROCEDURE — 3700000000 HC ANESTHESIA ATTENDED CARE: Performed by: INTERNAL MEDICINE

## 2021-09-29 PROCEDURE — 3700000001 HC ADD 15 MINUTES (ANESTHESIA): Performed by: INTERNAL MEDICINE

## 2021-09-29 PROCEDURE — 3609027000 HC COLONOSCOPY: Performed by: INTERNAL MEDICINE

## 2021-09-29 RX ORDER — SODIUM CHLORIDE 9 MG/ML
INJECTION, SOLUTION INTRAVENOUS CONTINUOUS
Status: DISCONTINUED | OUTPATIENT
Start: 2021-09-29 | End: 2021-09-29 | Stop reason: HOSPADM

## 2021-09-29 RX ORDER — SODIUM CHLORIDE 0.9 % (FLUSH) 0.9 %
10 SYRINGE (ML) INJECTION PRN
Status: DISCONTINUED | OUTPATIENT
Start: 2021-09-29 | End: 2021-09-29 | Stop reason: HOSPADM

## 2021-09-29 RX ORDER — ONDANSETRON 2 MG/ML
4 INJECTION INTRAMUSCULAR; INTRAVENOUS
Status: DISCONTINUED | OUTPATIENT
Start: 2021-09-29 | End: 2021-09-29 | Stop reason: HOSPADM

## 2021-09-29 RX ORDER — PROPOFOL 10 MG/ML
INJECTION, EMULSION INTRAVENOUS PRN
Status: DISCONTINUED | OUTPATIENT
Start: 2021-09-29 | End: 2021-09-29 | Stop reason: SDUPTHER

## 2021-09-29 RX ORDER — SODIUM CHLORIDE 9 MG/ML
25 INJECTION, SOLUTION INTRAVENOUS PRN
Status: DISCONTINUED | OUTPATIENT
Start: 2021-09-29 | End: 2021-09-29 | Stop reason: HOSPADM

## 2021-09-29 RX ORDER — SODIUM CHLORIDE 0.9 % (FLUSH) 0.9 %
10 SYRINGE (ML) INJECTION EVERY 12 HOURS SCHEDULED
Status: DISCONTINUED | OUTPATIENT
Start: 2021-09-29 | End: 2021-09-29 | Stop reason: HOSPADM

## 2021-09-29 RX ADMIN — PROPOFOL 30 MG: 10 INJECTION, EMULSION INTRAVENOUS at 07:20

## 2021-09-29 RX ADMIN — PROPOFOL 30 MG: 10 INJECTION, EMULSION INTRAVENOUS at 07:15

## 2021-09-29 RX ADMIN — PROPOFOL 30 MG: 10 INJECTION, EMULSION INTRAVENOUS at 07:16

## 2021-09-29 RX ADMIN — PROPOFOL 20 MG: 10 INJECTION, EMULSION INTRAVENOUS at 07:13

## 2021-09-29 RX ADMIN — PROPOFOL 20 MG: 10 INJECTION, EMULSION INTRAVENOUS at 07:12

## 2021-09-29 RX ADMIN — PROPOFOL 30 MG: 10 INJECTION, EMULSION INTRAVENOUS at 07:18

## 2021-09-29 RX ADMIN — PROPOFOL 30 MG: 10 INJECTION, EMULSION INTRAVENOUS at 07:14

## 2021-09-29 RX ADMIN — SODIUM CHLORIDE: 9 INJECTION, SOLUTION INTRAVENOUS at 06:49

## 2021-09-29 RX ADMIN — PROPOFOL 100 MG: 10 INJECTION, EMULSION INTRAVENOUS at 07:11

## 2021-09-29 RX ADMIN — PROPOFOL 30 MG: 10 INJECTION, EMULSION INTRAVENOUS at 07:17

## 2021-09-29 ASSESSMENT — LIFESTYLE VARIABLES: SMOKING_STATUS: 0

## 2021-09-29 ASSESSMENT — PAIN SCALES - GENERAL
PAINLEVEL_OUTOF10: 0
PAINLEVEL_OUTOF10: 0

## 2021-09-29 ASSESSMENT — PAIN - FUNCTIONAL ASSESSMENT: PAIN_FUNCTIONAL_ASSESSMENT: 0-10

## 2021-09-29 NOTE — H&P
[facility]   Pre-operative History and Physical    Patient: Hailey Prather  : 1955  Acct#: [de-identified]    History Obtained From: electronic medical record    HISTORY OF PRESENT ILLNESS  Procedure:Colonoscopy  Indications:screening  Past Medical History:        Diagnosis Date    Colonic polyp     Diverticulosis     RBBB          Past Surgical History:        Procedure Laterality Date    ACHILLES TENDON SURGERY      COLONOSCOPY      GASTRIC RESTRICTION SURGERY  2012    sleeve     Medications prior to admission:   Prior to Admission medications    Medication Sig Start Date End Date Taking? Authorizing Provider   Multiple Vitamins-Minerals (THERAPEUTIC MULTIVITAMIN-MINERALS) tablet Take 1 tablet by mouth daily   Yes Historical Provider, MD   aspirin EC 81 MG EC tablet Take 1 tablet by mouth daily 21  Yes Grant Vera MD     Allergies:   Patient has no known allergies. Social History     Socioeconomic History    Marital status:      Spouse name: Not on file    Number of children: Not on file    Years of education: Not on file    Highest education level: Not on file   Occupational History    Not on file   Tobacco Use    Smoking status: Never Smoker    Smokeless tobacco: Never Used   Vaping Use    Vaping Use: Never used   Substance and Sexual Activity    Alcohol use: Yes    Drug use: Never    Sexual activity: Yes     Partners: Female   Other Topics Concern    Not on file   Social History Narrative    Not on file     Social Determinants of Health     Financial Resource Strain: Low Risk     Difficulty of Paying Living Expenses: Not hard at all   Food Insecurity: No Food Insecurity    Worried About Running Out of Food in the Last Year: Never true    [address] in the Last Year: Never true   Transportation Needs:     Lack of Transportation (Medical):      Lack of Transportation (Non-Medical):    Physical Activity:     Days of Exercise per Week:     Minutes of Exercise per Session:    Stress:     Feeling of Stress :    Social Connections:     Frequency of Communication with Friends and Family:     Frequency of Social Gatherings with Friends and Family:     Attends Mormon Services:     Active Member of Clubs or Organizations:     Attends Club or Organization Meetings:     Marital Status:    Intimate Partner Violence:     Fear of Current or Ex-Partner:     Emotionally Abused:     Physically Abused:     Sexually Abused:      Family History   Problem Relation Age of Onset    Other Mother         MVA    Heart Failure Father     Other Brother         Prostate cancer         PHYSICAL EXAM:      /88   Pulse 70   Temp 96.7 °F (35.9 °C) (Temporal)   Resp 16   Ht 6' (1.829 m)   Wt 260 lb (117.9 kg)   SpO2 97%   BMI 35.26 kg/m²  I        Heart:normal    Lungs: normal    Abdomen: normal      ASA Grade:  See anesthesia note      ASSESSMENT AND PLAN:    1. Procedure options, risks and benefits reviewed with patient and expresses understanding.

## 2021-09-29 NOTE — ANESTHESIA POSTPROCEDURE EVALUATION
Department of Anesthesiology  Postprocedure Note    Patient: Philip Winston  MRN: [id number]  YOB: 1955  Date of evaluation: 9/29/2021  Time:  7:29 AM     Procedure Summary     Date: 09/29/21 Room / Location: [address] [place] [address]    Anesthesia Start: 0705 Anesthesia Stop: 3455    Procedure: COLORECTAL CANCER SCREENING, NOT HIGH RISK (N/A ) Diagnosis:       Screen for colon cancer      (Screen for colon cancer)    Surgeons: Danii Rodriguez MD Responsible Provider: Aishwarya Duong MD    Anesthesia Type: MAC ASA Status: 3          Anesthesia Type: MAC    Yrn Phase I: Yrn Score: 10    Yrn Phase II:      Last vitals: Reviewed and per EMR flowsheets. Anesthesia Post Evaluation    Patient location during evaluation: PACU  Patient participation: waiting for patient participation  Level of consciousness: sleepy but conscious  Airway patency: patent  Nausea & Vomiting: no nausea and no vomiting  Complications: no  Cardiovascular status: hemodynamically stable and blood pressure returned to baseline  Respiratory status: spontaneous ventilation, nonlabored ventilation and room air  Hydration status: stable  Comments: Mr. Diego Durand resting comfortably upon arrival to PACU. Saturating well on room air. Will allow patient to become more alert before anticipated discharge to home. No concerns at this time.        Aishwarya Duong MD  9/29/2021 7:30 AM

## 2021-09-29 NOTE — PROCEDURES
[facility]  Endoscopy Note    Patient: Lexie Negro  : 1955  Acct#: [de-identified]    Procedure: Colonoscopy     Date:  2021    Surgeon:  Farzaneh Segura MD, MD    Referring Physician:  Levar Squires    Previous Colonoscopy: Yes  Date: unknown  Greater than 3 years? Yes    Preoperative Diagnosis:  surveillance    Postoperative Diagnosis:  Normal colon    Anesthesia:  See anesthesia note    Indications: This is a 78y.o. year old male who presents today with previous adenomatous polyp. Procedure: An informed consent was obtained from the patient after explanation of indications, benefits, possible risks and complications of the procedure. The patient was then taken to the endoscopy suite, placed in the left lateral decubitus position, and the above IV anesthesia was administered. A digital rectal examination was performed and revealed negative without mass, lesions or tenderness. The Olympus CFQ-180-AL video colonoscope was placed in the patient's rectum under digital direction and advanced to the cecum. The cecum was identified by characteristic anatomy and ballottment. The ileocecal valve was identified. The preparation was excellent. The scope was then withdrawn back through the cecum, ascending, transverse, descending and sigmoid colons. Carefull circumferential examination of the mucosa in these areas demonstrated normal colonic mucosa throughout. The scope was then withdrawn into the rectum and retroflexed. The retroflexed view of the anal verge and rectum demonstrates no abnormalities. The scope was straightened, the colon was decompressed and the scope was withdrawn from the patient. The patient tolerated the procedure well and was taken to the PACU in good condition. Estimated Blood Loss:  none    Impression:  Diverticulosis. Recommendations:  Repeat colonoscopy in 10 years.     Farzaneh Segura MD, MD   [facility]  2021

## 2022-05-26 NOTE — PROGRESS NOTES
[facility] ENDOSCOPY EGD PRE-OPERATIVE INSTRUCTIONS    Procedure date__6/1/22_______Arrival time__1315_________        Surgery time____1415________       Nothing by mouth after midnight the night before the procedure. This includes water chewing gum, mints and ice chips. You may brush your teeth and gargle the morning of your surgery, but do not swallow the water    You may be asked to stop blood thinners such as Coumadin, Plavix, Fragmin, Lovenox, etc., or any anti-inflammatories such as:  Aspirin, Ibuprofen, Advil, Naproxen prior to your procedure. We also ask that you stop any OTC medications such as fish oil, vitamin E, glucosamine, garlic, Multivitamins, COQ 10, etc.    You must make arrangements for a responsible adult to arrive with you and stay in our waiting area during your procedure. They will also need to take you home after your procedure. For your safety you will not be allowed to leave alone or drive yourself home. Also for your safety, it is strongly suggested that someone stay with you the first 24 hours after your procedure. For your comfort, please wear simple loose fitting clothing to the center. Please do not bring valuables. If you have a living will and a durable power of  for healthcare, please bring in a copy.     You will need to bring a photo ID and insurance card    Our goal is to provide you with excellent care so if you have any questions, please contact us at the [facility] at [phone]         Please note these are generalized instructions for all EGD cases, you may be provided with more specific instructions if necessary

## 2022-06-01 ENCOUNTER — HOSPITAL ENCOUNTER (OUTPATIENT)
Age: 67
Setting detail: OUTPATIENT SURGERY
Discharge: HOME | End: 2022-06-01
Attending: INTERNAL MEDICINE | Admitting: INTERNAL MEDICINE
Payer: MEDICARE

## 2022-06-01 ENCOUNTER — ANESTHESIA EVENT (OUTPATIENT)
Dept: ENDOSCOPY | Age: 67
End: 2022-06-01
Payer: MEDICARE

## 2022-06-01 ENCOUNTER — ANESTHESIA (OUTPATIENT)
Dept: ENDOSCOPY | Age: 67
End: 2022-06-01
Payer: MEDICARE

## 2022-06-01 VITALS
BODY MASS INDEX: 35.65 KG/M2 | TEMPERATURE: 97 F | HEIGHT: 73 IN | RESPIRATION RATE: 16 BRPM | WEIGHT: 269 LBS | OXYGEN SATURATION: 98 % | DIASTOLIC BLOOD PRESSURE: 67 MMHG | HEART RATE: 61 BPM | SYSTOLIC BLOOD PRESSURE: 110 MMHG

## 2022-06-01 DIAGNOSIS — R13.10 DYSPHAGIA, UNSPECIFIED TYPE: ICD-10-CM

## 2022-06-01 PROCEDURE — 6360000002 HC RX W HCPCS: Performed by: NURSE ANESTHETIST, CERTIFIED REGISTERED

## 2022-06-01 PROCEDURE — 2500000003 HC RX 250 WO HCPCS: Performed by: NURSE ANESTHETIST, CERTIFIED REGISTERED

## 2022-06-01 PROCEDURE — 7100000011 HC PHASE II RECOVERY - ADDTL 15 MIN: Performed by: INTERNAL MEDICINE

## 2022-06-01 PROCEDURE — 3609012400 HC EGD TRANSORAL BIOPSY SINGLE/MULTIPLE: Performed by: INTERNAL MEDICINE

## 2022-06-01 PROCEDURE — 7100000010 HC PHASE II RECOVERY - FIRST 15 MIN: Performed by: INTERNAL MEDICINE

## 2022-06-01 PROCEDURE — 88305 TISSUE EXAM BY PATHOLOGIST: CPT

## 2022-06-01 PROCEDURE — 3609015300 HC ESOPHAGEAL DILATION MALONEY: Performed by: INTERNAL MEDICINE

## 2022-06-01 PROCEDURE — 2709999900 HC NON-CHARGEABLE SUPPLY: Performed by: INTERNAL MEDICINE

## 2022-06-01 PROCEDURE — 2580000003 HC RX 258: Performed by: STUDENT IN AN ORGANIZED HEALTH CARE EDUCATION/TRAINING PROGRAM

## 2022-06-01 PROCEDURE — 3700000000 HC ANESTHESIA ATTENDED CARE: Performed by: INTERNAL MEDICINE

## 2022-06-01 RX ORDER — SODIUM CHLORIDE 9 MG/ML
INJECTION, SOLUTION INTRAVENOUS PRN
Status: DISCONTINUED | OUTPATIENT
Start: 2022-06-01 | End: 2022-06-01 | Stop reason: HOSPADM

## 2022-06-01 RX ORDER — LIDOCAINE HYDROCHLORIDE 20 MG/ML
INJECTION, SOLUTION INFILTRATION; PERINEURAL PRN
Status: DISCONTINUED | OUTPATIENT
Start: 2022-06-01 | End: 2022-06-01 | Stop reason: SDUPTHER

## 2022-06-01 RX ORDER — SODIUM CHLORIDE 0.9 % (FLUSH) 0.9 %
5-40 SYRINGE (ML) INJECTION PRN
Status: DISCONTINUED | OUTPATIENT
Start: 2022-06-01 | End: 2022-06-01 | Stop reason: HOSPADM

## 2022-06-01 RX ORDER — SODIUM CHLORIDE 0.9 % (FLUSH) 0.9 %
5-40 SYRINGE (ML) INJECTION EVERY 12 HOURS SCHEDULED
Status: DISCONTINUED | OUTPATIENT
Start: 2022-06-01 | End: 2022-06-01 | Stop reason: HOSPADM

## 2022-06-01 RX ORDER — PROPOFOL 10 MG/ML
INJECTION, EMULSION INTRAVENOUS CONTINUOUS PRN
Status: DISCONTINUED | OUTPATIENT
Start: 2022-06-01 | End: 2022-06-01 | Stop reason: SDUPTHER

## 2022-06-01 RX ORDER — SODIUM CHLORIDE 9 MG/ML
INJECTION, SOLUTION INTRAVENOUS CONTINUOUS
Status: DISCONTINUED | OUTPATIENT
Start: 2022-06-01 | End: 2022-06-01 | Stop reason: HOSPADM

## 2022-06-01 RX ADMIN — LIDOCAINE HYDROCHLORIDE 50 MG: 20 INJECTION, SOLUTION INFILTRATION; PERINEURAL at 08:24

## 2022-06-01 RX ADMIN — SODIUM CHLORIDE: 9 INJECTION, SOLUTION INTRAVENOUS at 07:49

## 2022-06-01 RX ADMIN — PROPOFOL 180 MCG/KG/MIN: 10 INJECTION, EMULSION INTRAVENOUS at 08:24

## 2022-06-01 ASSESSMENT — PAIN SCALES - GENERAL
PAINLEVEL_OUTOF10: 0

## 2022-06-01 ASSESSMENT — LIFESTYLE VARIABLES: SMOKING_STATUS: 0

## 2022-06-01 ASSESSMENT — PAIN - FUNCTIONAL ASSESSMENT: PAIN_FUNCTIONAL_ASSESSMENT: 0-10

## 2022-06-01 NOTE — PROCEDURES
[facility]  Endoscopy Note    Patient: Daylin Palomino  : 1955  Acct#: [de-identified]    Procedure: Esophagogastroduodenoscopy with biopsy, esophageal dilation    Date:  2022     Surgeon:  Andrés Romero MD, MD    Referring Physician:  Quynh Fraga    Preoperative Diagnosis:  dysphagia    Postoperative Diagnosis:  Possible Barretts and esophageal stricture dilated with a 46 and 48 Bulgarian bougie. Anesthesia: see anesthesia note. Indications: This is a 78y.o. year old male who presents today with dysphagia. Description of Procedure:  Informed consent was obtained from the patient after explanation of indications, benefits and possible risks and complications of the procedure. The patient was then taken to the endoscopy suite, placed in the left lateral decubitus position and the above IV sedation was administrered. The Olympus videoendoscope was placed in the patient's mouth and under direct visualization passed into the esophagus. Visualization of the esophagus demonstrated a tongue of erythema suspicious for Vee's. This was biopsied. The esophageal stricture was dilated with a 46 and 48 Bulgarian bougie. .     The scope was then advanced into the stomach. Visualization of the gastric body and antrum demonstrated gastritis. The antrum was biopsied. .  A retroflexed exam of the gastric cardia and fundus demonstrated hiatal hernia. .  The pylorus was patent and the scope was advanced into the duodenum. Visualization of the duodenal bulb demonstrated normal..  The second portion of the duodenum demonstrated normal..    The scope was then withdrawn back into the stomach, it was decompressed, and the scope was completely withdrawn. The patient tolerated the procedure well and was taken to the post anesthesia care unit in good condition. Estimated Blood loss:  Minimal.    Impression: Possible Vee's and esophageal stricture dilated with a 46 and 48 Bulgarian bougie. Hiatal hernia. Gastritis. Recommendations:Await pathology. Start Omeprazole.     Hal Izaguirre MD, MD  [facility]

## 2022-06-01 NOTE — ANESTHESIA POSTPROCEDURE EVALUATION
Department of Anesthesiology  Postprocedure Note    Patient: Nissa Bhatt  MRN: [id number]  YOB: 1955  Date of evaluation: 6/1/2022  Time:  1:32 PM     Procedure Summary     Date: 06/01/22 Room / Location: [address] [place] [address]    Anesthesia Start: 0820 Anesthesia Stop: 2618    Procedures:       EGD BIOPSY (N/A )      ESOPHAGEAL DILATION Bre Thad (N/A ) Diagnosis:       Dysphagia, unspecified type      (Dysphagia)    Surgeons: Mark Singh MD Responsible Provider: Skylar Kent MD    Anesthesia Type: MAC ASA Status: 3          Anesthesia Type: MAC    Yrn Phase I: Yrn Score: 10    Yrn Phase II: Yrn Score: 10    Last vitals: Reviewed and per EMR flowsheets. Anesthesia Post Evaluation    Patient location during evaluation: PACU  Patient participation: complete - patient participated  Level of consciousness: awake and alert  Airway patency: patent  Nausea & Vomiting: no nausea and no vomiting  Complications: no  Cardiovascular status: hemodynamically stable and blood pressure returned to baseline  Respiratory status: spontaneous ventilation, nonlabored ventilation and room air  Hydration status: stable  Comments: Mr. Lorenzo Mccloud was seen resting comfortably following procedure. Appropriate for discharge home with .

## 2022-06-01 NOTE — H&P
[facility]   Pre-operative History and Physical    Patient: Armand Becerril  : 1955  Acct#: [de-identified]    History Obtained From: electronic medical record    HISTORY OF PRESENT ILLNESS  Procedure:EGD  Indications:dysphagia  Past Medical History:        Diagnosis Date    Colonic polyp     Diverticulosis     Hiatal hernia     RBBB          Past Surgical History:        Procedure Laterality Date    ACHILLES TENDON SURGERY      COLONOSCOPY      COLONOSCOPY N/A 2021    COLORECTAL CANCER SCREENING, NOT HIGH RISK performed by Vishal Leiva MD at [address] [place] [address]  2012    sleeve     Medications prior to admission:   Prior to Admission medications    Medication Sig Start Date End Date Taking? Authorizing Provider   Multiple Vitamins-Minerals (THERAPEUTIC MULTIVITAMIN-MINERALS) tablet Take 1 tablet by mouth daily    Historical Provider, MD   aspirin EC 81 MG EC tablet Take 1 tablet by mouth daily 21   Alexei Horton MD     Allergies:   Patient has no known allergies. Social History     Socioeconomic History    Marital status:      Spouse name: Not on file    Number of children: Not on file    Years of education: Not on file    Highest education level: Not on file   Occupational History    Not on file   Tobacco Use    Smoking status: Never Smoker    Smokeless tobacco: Never Used   Vaping Use    Vaping Use: Never used   Substance and Sexual Activity    Alcohol use:  Yes    Drug use: Never    Sexual activity: Yes     Partners: Female   Other Topics Concern    Not on file   Social History Narrative    Not on file     Social Determinants of Health     Financial Resource Strain: Low Risk     Difficulty of Paying Living Expenses: Not hard at all   Food Insecurity: No Food Insecurity    Worried About Running Out of Food in the Last Year: Never true    [place] of Food in the Last Year: Never true   Transportation Needs:     Lack of Transportation (Medical): Not on file    Lack of Transportation (Non-Medical): Not on file   Physical Activity:     Days of Exercise per Week: Not on file    Minutes of Exercise per Session: Not on file   Stress:     Feeling of Stress : Not on file   Social Connections:     Frequency of Communication with Friends and Family: Not on file    Frequency of Social Gatherings with Friends and Family: Not on file    Attends Latter-day Services: Not on file    Active Member of [address] [place] [address] or Organizations: Not on file    Attends Club or Organization Meetings: Not on file    Marital Status: Not on file   Intimate Partner Violence:     Fear of Current or Ex-Partner: Not on file    Emotionally Abused: Not on file    Physically Abused: Not on file    Sexually Abused: Not on file   Housing Stability:     Unable to Pay for Housing in the Last Year: Not on file    Number of [place] in the Last Year: Not on file    Unstable Housing in the Last Year: Not on file     Family History   Problem Relation Age of Onset    Other Mother         MVA    Heart Failure Father     Other Brother         Prostate cancer         PHYSICAL EXAM:      /77   Pulse 61   Temp 96.9 °F (36.1 °C) (Temporal)   Resp 16   Ht 6' 1\" (1.854 m)   Wt 269 lb (122 kg)   SpO2 96%   BMI 35.49 kg/m²  I        Heart:normal    Lungs: normal    Abdomen: normal      ASA Grade:  See anesthesia note      ASSESSMENT AND PLAN:    1. Procedure options, risks and benefits reviewed with patient and expresses understanding.

## 2022-06-01 NOTE — ANESTHESIA PRE PROCEDURE
Department of Anesthesiology  Preprocedure Note       Name:  Albertina Crawley   Age:  78 y.o.  :  1955                                          MRN:  [id number]         Date:  2022      Surgeon: Karena Mann):  Naz Haro MD    Procedure: Procedure(s):  EGD ESOPHAGOGASTRODUODENOSCOPY    Medications prior to admission:   Prior to Admission medications    Medication Sig Start Date End Date Taking?  Authorizing Provider   Multiple Vitamins-Minerals (THERAPEUTIC MULTIVITAMIN-MINERALS) tablet Take 1 tablet by mouth daily    Historical Provider, MD   aspirin EC 81 MG EC tablet Take 1 tablet by mouth daily 21   Grzegorz Kohli MD       Current medications:    Current Facility-Administered Medications   Medication Dose Route Frequency Provider Last Rate Last Admin    0.9 % sodium chloride infusion   IntraVENous Continuous Severo Laughter,  mL/hr at 22 0759 NoRateChange at 22 0759    sodium chloride flush 0.9 % injection 5-40 mL  5-40 mL IntraVENous 2 times per day Severo Laughter, MD        sodium chloride flush 0.9 % injection 5-40 mL  5-40 mL IntraVENous PRN Severo Laughter, MD        0.9 % sodium chloride infusion   IntraVENous PRN Severo Laughter, MD           Allergies:  No Known Allergies    Problem List:    Patient Active Problem List   Diagnosis Code    Colonic polyp K63.5    Diverticulosis K57.90    RBBB I45.10    Obesity surgery status Z98.84    Diverticulosis of large intestine without hemorrhage K57.30    Class 2 obesity due to excess calories without serious comorbidity in adult E66.09    Elevated blood pressure reading R03.0       Past Medical History:        Diagnosis Date    Colonic polyp     Diverticulosis     Hiatal hernia     RBBB            Past Surgical History:        Procedure Laterality Date    ACHILLES TENDON SURGERY      COLONOSCOPY      COLONOSCOPY N/A 2021    COLORECTAL CANCER SCREENING, NOT HIGH RISK performed by Naz Haro MD at [address] GASTRIC RESTRICTION SURGERY  05/01/2012    sleeve       Social History:    Social History     Tobacco Use    Smoking status: Never Smoker    Smokeless tobacco: Never Used   Substance Use Topics    Alcohol use: Yes                                Counseling given: Not Answered      Vital Signs (Current):   Vitals:    05/26/22 1239 06/01/22 0746   BP:  132/77   Pulse:  61   Resp:  16   Temp:  96.9 °F (36.1 °C)   TempSrc:  Temporal   SpO2:  96%   Weight: 272 lb (123.4 kg) 269 lb (122 kg)   Height: 6' 1\" (1.854 m) 6' 1\" (1.854 m)                                              BP Readings from Last 3 Encounters:   06/01/22 132/77   03/24/22 112/69   11/22/21 129/63       NPO Status: Time of last liquid consumption: 2330                        Time of last solid consumption: 2330                        Date of last liquid consumption: 05/31/22                        Date of last solid food consumption: 05/31/22    BMI:   Wt Readings from Last 3 Encounters:   06/01/22 269 lb (122 kg)   03/24/22 272 lb (123.4 kg)   11/22/21 274 lb (124.3 kg)     Body mass index is 35.49 kg/m². CBC:   Lab Results   Component Value Date    WBC 5.1 02/18/2021    RBC 4.46 02/18/2021    HGB 14.7 02/18/2021    HCT 42.5 02/18/2021    MCV 95.3 02/18/2021    RDW 13.0 02/18/2021     02/18/2021       CMP:   Lab Results   Component Value Date     03/24/2022    K 5.0 03/24/2022     03/24/2022    CO2 23 03/24/2022    BUN 9 03/24/2022    CREATININE 0.9 03/24/2022    GFRAA >60 03/24/2022    GFRAA >60 12/18/2012    AGRATIO 1.8 02/18/2021    LABGLOM >60 03/24/2022    GLUCOSE 89 03/24/2022    PROT 6.7 02/18/2021    PROT 6.9 12/18/2012    CALCIUM 9.3 03/24/2022    BILITOT 0.9 02/18/2021    ALKPHOS 84 02/18/2021    AST 23 02/18/2021    ALT 19 02/18/2021       POC Tests: No results for input(s): POCGLU, POCNA, POCK, POCCL, POCBUN, POCHEMO, POCHCT in the last 72 hours.     Coags: No results found for: PROTIME, INR, APTT    HCG (If Applicable): No results found for: PREGTESTUR, PREGSERUM, HCG, HCGQUANT     ABGs: No results found for: PHART, PO2ART, JMJ0TSF, AVX7YAT, BEART, P6XKLSYH     Type & Screen (If Applicable):  No results found for: LABABO, LABRH    Drug/Infectious Status (If Applicable):  No results found for: HIV, HEPCAB    COVID-19 Screening (If Applicable):   Lab Results   Component Value Date    COVID19 NOT DETECTED 07/09/2020           Anesthesia Evaluation   no history of anesthetic complications:   Airway: Mallampati: II  TM distance: >3 FB   Neck ROM: full  Mouth opening: > = 3 FB   Dental:          Pulmonary:       (-) COPD, asthma, rhonchi, wheezes, rales and not a current smoker  Sleep apnea: denies. Cardiovascular:  Exercise tolerance: good (>4 METS),   (+) hypertension (no medications since bariatric surgery):, dysrhythmias (RBBB):, hyperlipidemia    (-) orthopnea,  POZO, weak pulses and peripheral edema      Rhythm: regular  Rate: normal                 ROS comment: EKG:  Sinus rhythm   Right bundle branch block. Old inferior infarct. Echocardiogram (2011): Mild LVH. Normal LVEF 60-65%. Neuro/Psych:      (-) seizures, TIA and CVA            ROS comment: Left knee pain GI/Hepatic/Renal:   (+) hiatal hernia,      (-) liver disease, no renal disease and bowel prepGERD: denies. Morbid obesity: BMI: 36 s/p gastric sleeve. ROS comment: + Dysphagia: endorses sensation of food getting stuck in esophagus  + Diverticulosis  . Endo/Other:        (-) diabetes mellitus (HgbA1c: 5.1%), hypothyroidism, blood dyscrasia               Abdominal:   (+) obese,     Abdomen: soft. Vascular: Other Findings: Resting comfortably supine            Anesthesia Plan      MAC     ASA 3     (NPO appropriate; Marimar Pond denies active nausea / reflux.)        Anesthetic plan and risks discussed with patient. Plan discussed with CRNA.                 This pre-anesthesia assessment may be used as a history and physical.    DOS STAFF ADDENDUM:    Pt seen and examined, chart reviewed (including anesthesia, drug and allergy history). No interval changes to history and physical examination. Anesthetic plan, risks, benefits, alternatives, and personnel involved discussed with patient. Patient verbalized an understanding and agrees to proceed.       Morro Urias MD  June 1, 2022  8:01 AM

## 2022-08-19 RX ORDER — OMEPRAZOLE 40 MG/1
40 CAPSULE, DELAYED RELEASE ORAL 2 TIMES DAILY
COMMUNITY

## 2022-08-23 ENCOUNTER — ANESTHESIA EVENT (OUTPATIENT)
Dept: ENDOSCOPY | Age: 67
End: 2022-08-23
Payer: MEDICARE

## 2022-08-24 ENCOUNTER — HOSPITAL ENCOUNTER (OUTPATIENT)
Age: 67
Setting detail: OUTPATIENT SURGERY
Discharge: HOME | End: 2022-08-24
Attending: INTERNAL MEDICINE | Admitting: INTERNAL MEDICINE
Payer: MEDICARE

## 2022-08-24 ENCOUNTER — ANESTHESIA (OUTPATIENT)
Dept: ENDOSCOPY | Age: 67
End: 2022-08-24
Payer: MEDICARE

## 2022-08-24 VITALS
TEMPERATURE: 97.1 F | HEIGHT: 73 IN | DIASTOLIC BLOOD PRESSURE: 89 MMHG | SYSTOLIC BLOOD PRESSURE: 137 MMHG | HEART RATE: 52 BPM | RESPIRATION RATE: 16 BRPM | OXYGEN SATURATION: 93 % | WEIGHT: 272 LBS | BODY MASS INDEX: 36.05 KG/M2

## 2022-08-24 DIAGNOSIS — K22.710 BARRETT'S ESOPHAGUS WITH LOW GRADE DYSPLASIA: ICD-10-CM

## 2022-08-24 PROCEDURE — 3700000000 HC ANESTHESIA ATTENDED CARE: Performed by: INTERNAL MEDICINE

## 2022-08-24 PROCEDURE — 2709999900 HC NON-CHARGEABLE SUPPLY: Performed by: INTERNAL MEDICINE

## 2022-08-24 PROCEDURE — 7100000010 HC PHASE II RECOVERY - FIRST 15 MIN: Performed by: INTERNAL MEDICINE

## 2022-08-24 PROCEDURE — 2580000003 HC RX 258: Performed by: ANESTHESIOLOGY

## 2022-08-24 PROCEDURE — 7100000011 HC PHASE II RECOVERY - ADDTL 15 MIN: Performed by: INTERNAL MEDICINE

## 2022-08-24 PROCEDURE — 3700000001 HC ADD 15 MINUTES (ANESTHESIA): Performed by: INTERNAL MEDICINE

## 2022-08-24 PROCEDURE — 3609012400 HC EGD TRANSORAL BIOPSY SINGLE/MULTIPLE: Performed by: INTERNAL MEDICINE

## 2022-08-24 PROCEDURE — 6360000002 HC RX W HCPCS

## 2022-08-24 PROCEDURE — 2500000003 HC RX 250 WO HCPCS

## 2022-08-24 PROCEDURE — 88305 TISSUE EXAM BY PATHOLOGIST: CPT

## 2022-08-24 RX ORDER — SODIUM CHLORIDE 9 MG/ML
INJECTION, SOLUTION INTRAVENOUS PRN
Status: CANCELLED | OUTPATIENT
Start: 2022-08-24

## 2022-08-24 RX ORDER — SODIUM CHLORIDE 9 MG/ML
INJECTION, SOLUTION INTRAVENOUS PRN
Status: DISCONTINUED | OUTPATIENT
Start: 2022-08-24 | End: 2022-08-24 | Stop reason: HOSPADM

## 2022-08-24 RX ORDER — PROPOFOL 10 MG/ML
INJECTION, EMULSION INTRAVENOUS PRN
Status: DISCONTINUED | OUTPATIENT
Start: 2022-08-24 | End: 2022-08-24 | Stop reason: SDUPTHER

## 2022-08-24 RX ORDER — SODIUM CHLORIDE 0.9 % (FLUSH) 0.9 %
5-40 SYRINGE (ML) INJECTION PRN
Status: DISCONTINUED | OUTPATIENT
Start: 2022-08-24 | End: 2022-08-24 | Stop reason: HOSPADM

## 2022-08-24 RX ORDER — SODIUM CHLORIDE 9 MG/ML
INJECTION, SOLUTION INTRAVENOUS CONTINUOUS
Status: CANCELLED | OUTPATIENT
Start: 2022-08-24

## 2022-08-24 RX ORDER — SODIUM CHLORIDE 0.9 % (FLUSH) 0.9 %
5-40 SYRINGE (ML) INJECTION PRN
Status: CANCELLED | OUTPATIENT
Start: 2022-08-24

## 2022-08-24 RX ORDER — LIDOCAINE HYDROCHLORIDE 20 MG/ML
INJECTION, SOLUTION EPIDURAL; INFILTRATION; INTRACAUDAL; PERINEURAL PRN
Status: DISCONTINUED | OUTPATIENT
Start: 2022-08-24 | End: 2022-08-24 | Stop reason: SDUPTHER

## 2022-08-24 RX ORDER — SODIUM CHLORIDE 0.9 % (FLUSH) 0.9 %
5-40 SYRINGE (ML) INJECTION EVERY 12 HOURS SCHEDULED
Status: DISCONTINUED | OUTPATIENT
Start: 2022-08-24 | End: 2022-08-24 | Stop reason: HOSPADM

## 2022-08-24 RX ORDER — SODIUM CHLORIDE 0.9 % (FLUSH) 0.9 %
5-40 SYRINGE (ML) INJECTION EVERY 12 HOURS SCHEDULED
Status: CANCELLED | OUTPATIENT
Start: 2022-08-24

## 2022-08-24 RX ADMIN — PROPOFOL 100 MG: 10 INJECTION, EMULSION INTRAVENOUS at 08:25

## 2022-08-24 RX ADMIN — SODIUM CHLORIDE: 9 INJECTION, SOLUTION INTRAVENOUS at 07:11

## 2022-08-24 RX ADMIN — PROPOFOL 50 MG: 10 INJECTION, EMULSION INTRAVENOUS at 08:31

## 2022-08-24 RX ADMIN — LIDOCAINE HYDROCHLORIDE 100 MG: 20 INJECTION, SOLUTION EPIDURAL; INFILTRATION; INTRACAUDAL; PERINEURAL at 08:25

## 2022-08-24 ASSESSMENT — PAIN SCALES - GENERAL
PAINLEVEL_OUTOF10: 0
PAINLEVEL_OUTOF10: 0

## 2022-08-24 ASSESSMENT — PAIN - FUNCTIONAL ASSESSMENT: PAIN_FUNCTIONAL_ASSESSMENT: NONE - DENIES PAIN

## 2022-08-24 ASSESSMENT — PAIN SCALES - WONG BAKER: WONGBAKER_NUMERICALRESPONSE: 0

## 2022-08-24 NOTE — H&P
[facility]   Pre-operative History and Physical    Patient: Luis Cleaning  : 1955  Acct#: [de-identified]    History Obtained From: electronic medical record    HISTORY OF PRESENT ILLNESS  Procedure:EGD  Indications:Vee's with dysplasia  Past Medical History:        Diagnosis Date    Colonic polyp     Diverticulosis     Hiatal hernia     RBBB          Past Surgical History:        Procedure Laterality Date    ACHILLES TENDON SURGERY      COLONOSCOPY      COLONOSCOPY N/A 2021    COLORECTAL CANCER SCREENING, NOT HIGH RISK performed by Tarik Caballero MD at [address] [place] [address] N/A 2022    ESOPHAGEAL DILATION Richardson Alter performed by Tarik Caballero MD at [address] [place] [address]  2012    sleeve    UPPER GASTROINTESTINAL ENDOSCOPY N/A 2022    EGD BIOPSY performed by Tarik [facility]ero MD at [facility]     Medications prior to admission:   Prior to Admission medications    Medication Sig Start Date End Date Taking? Authorizing Provider   omeprazole (PRILOSEC) 40 MG delayed release capsule Take 40 mg by mouth in the morning and at bedtime   Yes Historical Provider, MD   Multiple Vitamins-Minerals (THERAPEUTIC MULTIVITAMIN-MINERALS) tablet Take 1 tablet by mouth daily    Historical Provider, MD   aspirin EC 81 MG EC tablet Take 1 tablet by mouth daily 21   Lenora Neal MD     Allergies:   Patient has no known allergies.     Social History     Socioeconomic History    Marital status:      Spouse name: Not on file    Number of children: Not on file    Years of education: Not on file    Highest education level: Not on file   Occupational History    Not on file   Tobacco Use    Smoking status: Never    Smokeless tobacco: Never   Vaping Use    Vaping Use: Never used   Substance and Sexual Activity    Alcohol use: Yes     Comment: occasionally- wine    Drug use: Never    Sexual activity: Yes     Partners: Female   Other Topics Concern    Not on file   Social History Narrative    Not on file     Social Determinants of Health     Financial Resource Strain: Not on file   Food Insecurity: Not on file   Transportation Needs: Not on file   Physical Activity: Not on file   Stress: Not on file   Social Connections: Not on file   Intimate Partner Violence: Not on file   Housing Stability: Not on file     Family History   Problem Relation Age of Onset    Other Mother         MVA    Heart Failure Father     Other Brother         Prostate cancer         PHYSICAL EXAM:      /80   Pulse 55   Temp 97 °F (36.1 °C) (Temporal)   Resp 16   Ht 6' 1\" (1.854 m)   Wt 272 lb (123.4 kg)   SpO2 97%   BMI 35.89 kg/m²  I        Heart:normal    Lungs: normal    Abdomen: normal      ASA Grade:  See anesthesia note      ASSESSMENT AND PLAN:    1. Procedure options, risks and benefits reviewed with patient and expresses understanding.

## 2022-08-24 NOTE — DISCHARGE INSTRUCTIONS
[facility] Discharge Instructions   EGD (Esophagogastroduodenoscopy)    NAME:  Asia Hunter  YOB: 1955  MEDICAL RECORD NUMBER:  [id number]  DATE:  8/24/2022      After receiving Propofol (Diprivan) for Moderate Sedation:    Do not drive or operate any machinery until tomorrow  Do not sign any legal documents or make any critical decisions  Do not drink alcoholic beverages for 24 hours  Plan to spend a few hours resting before resuming your normal routine  Possible side effects are light headedness and sedation    You may resume your usual diet at home    Resume all your daily medications    Call your physician if any of the following occur: If you have any difficulty breathing: CALL 911  Neck swelling  Difficulty swallowing  Excessive pain or abdominal distention  Fever, chills, nausea or vomiting    If you are unable to reach your physician or symptoms worsen, proceed to the nearest Emergency Room    You may use warm salt water gargles, lozenges or Chloraseptic spray as needed for your sore throat. Biopsy Obtained: YES. If you have not heard from your physician in one week please call your physician's office for biopsy results, [phone]. Recommendations: Continue current medication    For questions or concerns please contact your GI physician's 24 hour call center at [phone].

## 2022-08-24 NOTE — PROCEDURES
[facility]  Endoscopy Note    Patient: Hugo Gonzalez  : 1955  Acct#: [de-identified]    Procedure: Esophagogastroduodenoscopy with biopsy    Date:  2022     Surgeon:  Jovita Malloy MD, MD    Referring Physician:  Nuris Richards    Preoperative Diagnosis:  Vee's with low grade dysplasia    Postoperative Diagnosis:  Short segment Vee's biopsied    Anesthesia: see anesthesia note. Indications: This is a 78y.o. year old male who presents today with Follow up Barretts esophagus. Description of Procedure:  Informed consent was obtained from the patient after explanation of indications, benefits and possible risks and complications of the procedure. The patient was then taken to the endoscopy suite, placed in the left lateral decubitus position and the above IV sedation was administrered. The Olympus videoendoscope was placed in the patient's mouth and under direct visualization passed into the esophagus. Visualization of the esophagus demonstrated a short segment of Eve's that was biopsied. .     The scope was then advanced into the stomach. Visualization of the gastric body and antrum demonstrated normal..  A retroflexed exam of the gastric cardia and fundus demonstrated normal..  The pylorus was patent and the scope was advanced into the duodenum. Visualization of the duodenal bulb demonstrated normal..  The second portion of the duodenum demonstrated normal..    The scope was then withdrawn back into the stomach, it was decompressed, and the scope was completely withdrawn. The patient tolerated the procedure well and was taken to the post anesthesia care unit in good condition. Estimated Blood loss:  minimal.    Impression: Short segment Vee's      Recommendations:Await pathology.     Jovita Malloy MD, MD  [facility]

## 2022-08-24 NOTE — ANESTHESIA POSTPROCEDURE EVALUATION
[facility] Department of Anesthesiology  Post-Anesthesia Note       Name:  Marlo Inman                                  Age:  78 y.o. MRN:  [id number]     Last Vitals & Oxygen Saturation: /89   Pulse 52   Temp 97.1 °F (36.2 °C) (Temporal)   Resp 16   Ht 6' 1\" (1.854 m)   Wt 272 lb (123.4 kg)   SpO2 93%   BMI 35.89 kg/m²   Patient Vitals for the past 4 hrs:   BP Temp Temp src Pulse Resp SpO2 Height Weight   08/24/22 0855 137/89 -- -- 52 16 93 % -- --   08/24/22 0847 125/69 -- -- 54 16 93 % -- --   08/24/22 0837 107/64 97.1 °F (36.2 °C) Temporal 58 16 93 % -- --   08/24/22 0709 132/80 97 °F (36.1 °C) Temporal 55 16 97 % 6' 1\" (1.854 m) 272 lb (123.4 kg)       Level of consciousness:  Awake, alert    Respiratory: Respirations easy, no distress. Stable. Cardiovascular: Hemodynamically stable. Hydration: Adequate. PONV: Adequately managed. Post-op pain: Adequately controlled. Post-op assessment: Tolerated anesthetic well without complication. Complications:  None.     Dylan Bueno MD  August 24, 2022   9:01 AM

## 2023-03-26 ENCOUNTER — APPOINTMENT (OUTPATIENT)
Dept: GENERAL RADIOLOGY | Age: 68
End: 2023-03-26
Payer: MEDICARE

## 2023-03-26 ENCOUNTER — HOSPITAL ENCOUNTER (EMERGENCY)
Age: 68
Discharge: HOME | End: 2023-03-26
Attending: EMERGENCY MEDICINE
Payer: MEDICARE

## 2023-03-26 VITALS
RESPIRATION RATE: 16 BRPM | WEIGHT: 304.24 LBS | HEART RATE: 70 BPM | TEMPERATURE: 98.5 F | BODY MASS INDEX: 40.14 KG/M2 | DIASTOLIC BLOOD PRESSURE: 65 MMHG | SYSTOLIC BLOOD PRESSURE: 133 MMHG | OXYGEN SATURATION: 99 %

## 2023-03-26 DIAGNOSIS — S42.202A CLOSED FRACTURE OF PROXIMAL END OF LEFT HUMERUS, UNSPECIFIED FRACTURE MORPHOLOGY, INITIAL ENCOUNTER: Primary | ICD-10-CM

## 2023-03-26 PROCEDURE — 99283 EMERGENCY DEPT VISIT LOW MDM: CPT

## 2023-03-26 PROCEDURE — 73030 X-RAY EXAM OF SHOULDER: CPT

## 2023-03-26 RX ORDER — LIDOCAINE 50 MG/G
1 PATCH TOPICAL DAILY
Qty: 10 PATCH | Refills: 0 | Status: SHIPPED | OUTPATIENT
Start: 2023-03-26 | End: 2023-04-05

## 2023-03-26 ASSESSMENT — PAIN DESCRIPTION - ONSET
ONSET: ON-GOING
ONSET: ON-GOING

## 2023-03-26 ASSESSMENT — PAIN - FUNCTIONAL ASSESSMENT
PAIN_FUNCTIONAL_ASSESSMENT: 0-10
PAIN_FUNCTIONAL_ASSESSMENT: PREVENTS OR INTERFERES SOME ACTIVE ACTIVITIES AND ADLS
PAIN_FUNCTIONAL_ASSESSMENT: 0-10
PAIN_FUNCTIONAL_ASSESSMENT: PREVENTS OR INTERFERES SOME ACTIVE ACTIVITIES AND ADLS

## 2023-03-26 ASSESSMENT — PAIN DESCRIPTION - DESCRIPTORS
DESCRIPTORS: ACHING
DESCRIPTORS: ACHING

## 2023-03-26 ASSESSMENT — PAIN DESCRIPTION - FREQUENCY
FREQUENCY: CONTINUOUS
FREQUENCY: CONTINUOUS

## 2023-03-26 ASSESSMENT — PAIN DESCRIPTION - ORIENTATION
ORIENTATION: LEFT
ORIENTATION: LEFT

## 2023-03-26 ASSESSMENT — PAIN DESCRIPTION - LOCATION
LOCATION: SHOULDER
LOCATION: ARM

## 2023-03-26 ASSESSMENT — PAIN DESCRIPTION - PAIN TYPE
TYPE: ACUTE PAIN
TYPE: ACUTE PAIN

## 2023-03-26 ASSESSMENT — ENCOUNTER SYMPTOMS
ABDOMINAL PAIN: 0
BACK PAIN: 0
SHORTNESS OF BREATH: 0

## 2023-03-26 ASSESSMENT — PAIN SCALES - GENERAL: PAINLEVEL_OUTOF10: 5

## 2023-03-26 NOTE — DISCHARGE INSTRUCTIONS
1.  In sling until seen by orthopedics call for an appointment. 2.  Rest and ice for the next day or so. 3.  Tylenol or ibuprofen over-the-counter for pain and escalate to prescribed medications if needed.   4.  Return emerged department for severe worsening pain swelling or numbness and tingling

## 2023-03-26 NOTE — ED TRIAGE NOTES
Pt arrived via EMS from home c/o shoulder pain post mechanical fall down a set of steps carrying a humidifier. EMS placed a left arm sling. Pt denies loss of consciousness and blood thinners. Skin warm and dry, abrasion on left upper arm. VS stable.

## 2023-03-26 NOTE — ED PROVIDER NOTES
found.    (Please note that portions of this note were completed with a voice recognition program.  Efforts were made to edit the dictations but occasionally words are mis-transcribed.)    Minerva Mortimer, MD (electronically signed)  Attending Emergency Physician          Minerva Mortimer, MD  03/26/23 6896

## 2023-03-27 ENCOUNTER — OFFICE VISIT (OUTPATIENT)
Dept: ORTHOPEDIC SURGERY | Age: 68
End: 2023-03-27
Payer: MEDICARE

## 2023-03-27 ENCOUNTER — TELEPHONE (OUTPATIENT)
Dept: ORTHOPEDIC SURGERY | Age: 68
End: 2023-03-27

## 2023-03-27 VITALS — HEIGHT: 73 IN | BODY MASS INDEX: 40.29 KG/M2 | WEIGHT: 304 LBS

## 2023-03-27 DIAGNOSIS — S42.292A OTHER CLOSED DISPLACED FRACTURE OF PROXIMAL END OF LEFT HUMERUS, INITIAL ENCOUNTER: Primary | ICD-10-CM

## 2023-03-27 PROCEDURE — 1123F ACP DISCUSS/DSCN MKR DOCD: CPT | Performed by: ORTHOPAEDIC SURGERY

## 2023-03-27 PROCEDURE — 1036F TOBACCO NON-USER: CPT | Performed by: ORTHOPAEDIC SURGERY

## 2023-03-27 PROCEDURE — L3660 SO 8 AB RSTR CAN/WEB PRE OTS: HCPCS | Performed by: ORTHOPAEDIC SURGERY

## 2023-03-27 PROCEDURE — 3017F COLORECTAL CA SCREEN DOC REV: CPT | Performed by: ORTHOPAEDIC SURGERY

## 2023-03-27 PROCEDURE — 99203 OFFICE O/P NEW LOW 30 MIN: CPT | Performed by: ORTHOPAEDIC SURGERY

## 2023-03-27 PROCEDURE — G8417 CALC BMI ABV UP PARAM F/U: HCPCS | Performed by: ORTHOPAEDIC SURGERY

## 2023-03-27 PROCEDURE — G8427 DOCREV CUR MEDS BY ELIG CLIN: HCPCS | Performed by: ORTHOPAEDIC SURGERY

## 2023-03-27 PROCEDURE — G8484 FLU IMMUNIZE NO ADMIN: HCPCS | Performed by: ORTHOPAEDIC SURGERY

## 2023-03-27 RX ORDER — TRAMADOL HYDROCHLORIDE 50 MG/1
50 TABLET ORAL EVERY 6 HOURS PRN
Qty: 20 TABLET | Refills: 0 | Status: SHIPPED | OUTPATIENT
Start: 2023-03-27 | End: 2023-04-01

## 2023-03-27 NOTE — PROGRESS NOTES
overlying the left shoulder is intact without evidence of lesion, laceration. Distal pulses are 2+ and symmetric bilaterally. Sensation is grossly intact to light touch and symmetric bilaterally. IMAGING:  Xrays dated 3/26/2023, 3 views of left shoulder were reviewed, and showed minimally displaced proximal humerus fracture. IMPRESSION:  Left minimally displaced proximal humerus fracture. PLAN:  I discussed that the overall alignment of this fracture is acceptable at this point and that we can try to treat this non-operatively in a sling left shoulder, with no heavy impact activities, and gentle ROM. We discussed the risk of nonunion and or malunion. We will see him  back in one week at which time we will get a new xray of the left shoulder. Procedures    Breg DLX Shoulder Immobilizer     Patient was prescribed a DLX Shoulder Immobilizer. The left shoulder will require stabilization / immobilization from this orthosis. The orthosis will assist in protecting the affected area, provide functional support and facilitate healing. The patient was educated and fit by a healthcare professional with expert knowledge and specialization in brace application while under the direct supervision of the treating physician. Verbal and written instructions for the use of and application of this item were provided. They were instructed to contact the office immediately should the brace result in increased pain, decreased sensation, increased swelling or worsening of the condition.      Rosemary Hammond MD

## 2023-03-31 ENCOUNTER — OFFICE VISIT (OUTPATIENT)
Dept: ORTHOPEDIC SURGERY | Age: 68
End: 2023-03-31

## 2023-03-31 VITALS — BODY MASS INDEX: 40.29 KG/M2 | WEIGHT: 304 LBS | HEIGHT: 73 IN

## 2023-03-31 DIAGNOSIS — S42.292A OTHER CLOSED DISPLACED FRACTURE OF PROXIMAL END OF LEFT HUMERUS, INITIAL ENCOUNTER: Primary | ICD-10-CM

## 2023-03-31 PROBLEM — S42.202A CLOSED FRACTURE OF LEFT PROXIMAL HUMERUS: Status: ACTIVE | Noted: 2023-03-31

## 2023-03-31 RX ORDER — HYDROCODONE BITARTRATE AND ACETAMINOPHEN 5; 325 MG/1; MG/1
1 TABLET ORAL EVERY 6 HOURS PRN
Qty: 20 TABLET | Refills: 0 | Status: SHIPPED | OUTPATIENT
Start: 2023-03-31 | End: 2023-04-05

## 2023-03-31 NOTE — PROGRESS NOTES
CHIEF COMPLAINT: Left shoulder pain/ minimally displaced proximal humerus comminuted fracture. DATE OF INJURY: 3/26/2023, DOT 3/31/2023    HISTORY:  Mr. Marah Degroot is a 75 y.o.  male right handed who presents today for f/u evaluation of a left shoulder injury. The patient reports that this injury occurred when he fell down steps. He was first seen and evaluated in [facility], when he was x-rayed and splinted, and asked to f/u with Orthopedics. The patient denies any other injuries. Rates pain a 8/10 VAS moderate, sharp, intermittent and show no change. Alleviating factors rest. Movement makes the pain worse, the sling and resting makes the pain better. No numbness or tingling sensation.       Past Medical History:   Diagnosis Date    Colonic polyp     Diverticulosis     Hiatal hernia     RBBB     2010       Past Surgical History:   Procedure Laterality Date    ACHILLES TENDON SURGERY  2014    COLONOSCOPY      COLONOSCOPY N/A 9/29/2021    COLORECTAL CANCER SCREENING, NOT HIGH RISK performed by Kristin Willams MD at [address] [place] [address] N/A 6/1/2022    ESOPHAGEAL DILATION Malachi Bimler performed by Kristin Willams MD at [address] [place] [address]  05/01/2012    sleeve    UPPER GASTROINTESTINAL ENDOSCOPY N/A 6/1/2022    EGD BIOPSY performed by Kristin Willams MD at [address] [place] [address] N/A 8/24/2022    EGD BIOPSY performed by Kristin Willams MD at [address] [place] [address] History     Socioeconomic History    Marital status:      Spouse name: Not on file    Number of children: Not on file    Years of education: Not on file    Highest education level: Not on file   Occupational History    Not on file   Tobacco Use    Smoking status: Never    Smokeless tobacco: Never   Vaping Use    Vaping Use: Never used   Substance and Sexual Activity    Alcohol use: Yes     Comment: occasionally- wine    Drug use: Never    Sexual activity: Yes

## 2023-04-20 ENCOUNTER — HOSPITAL ENCOUNTER (OUTPATIENT)
Dept: MRI IMAGING | Age: 68
Discharge: HOME | End: 2023-04-20
Payer: MEDICARE

## 2023-04-20 DIAGNOSIS — M53.3 PAIN IN SACRUM: ICD-10-CM

## 2023-04-20 PROCEDURE — 72195 MRI PELVIS W/O DYE: CPT

## 2023-04-21 SDOH — HEALTH STABILITY: PHYSICAL HEALTH: ON AVERAGE, HOW MANY MINUTES DO YOU ENGAGE IN EXERCISE AT THIS LEVEL?: 50 MIN

## 2023-04-21 SDOH — HEALTH STABILITY: PHYSICAL HEALTH: ON AVERAGE, HOW MANY DAYS PER WEEK DO YOU ENGAGE IN MODERATE TO STRENUOUS EXERCISE (LIKE A BRISK WALK)?: 6 DAYS

## 2023-04-21 ASSESSMENT — SOCIAL DETERMINANTS OF HEALTH (SDOH)

## 2023-04-24 ENCOUNTER — OFFICE VISIT (OUTPATIENT)
Dept: ORTHOPEDIC SURGERY | Age: 68
End: 2023-04-24
Payer: MEDICARE

## 2023-04-24 DIAGNOSIS — S32.10XA CLOSED FRACTURE OF SACRUM, UNSPECIFIED PORTION OF SACRUM, INITIAL ENCOUNTER (HCC): Primary | ICD-10-CM

## 2023-04-24 PROCEDURE — 27197 CLSD TX PELVIC RING FX: CPT | Performed by: ORTHOPAEDIC SURGERY

## 2023-04-24 PROCEDURE — 1036F TOBACCO NON-USER: CPT | Performed by: ORTHOPAEDIC SURGERY

## 2023-04-24 PROCEDURE — G8417 CALC BMI ABV UP PARAM F/U: HCPCS | Performed by: ORTHOPAEDIC SURGERY

## 2023-04-24 PROCEDURE — 99213 OFFICE O/P EST LOW 20 MIN: CPT | Performed by: ORTHOPAEDIC SURGERY

## 2023-04-24 PROCEDURE — 1123F ACP DISCUSS/DSCN MKR DOCD: CPT | Performed by: ORTHOPAEDIC SURGERY

## 2023-04-24 PROCEDURE — G8427 DOCREV CUR MEDS BY ELIG CLIN: HCPCS | Performed by: ORTHOPAEDIC SURGERY

## 2023-04-24 PROCEDURE — 3017F COLORECTAL CA SCREEN DOC REV: CPT | Performed by: ORTHOPAEDIC SURGERY

## 2023-04-24 NOTE — PROGRESS NOTES
[facility]  [facility]    Chief Complaint: Left buttock pain    HPI:  Artem Soler is a 75 y.o. who is here for initial evaluation of pain in the left buttock. He fell backwards down steps about 1 month ago while carrying a dehumidifier down to the basement. He sustained a left proximal humerus fracture which is being treated nonoperatively by Dr. Senait Herrera. He has been in a sling. He reports about a 2-week history of pain in the left lower back and buttock area. There is no other inciting event besides the fall 1 month ago. He has pain with walking but otherwise does not have pain when he is sitting or standing. The pain does not radiate. There is no associated numbness or tingling or weakness. He takes ibuprofen and Tylenol during the day to help with pain and tramadol at night. He walks without assistive device. He is here with his wife today. He comes in with a recent MRI done last week. Patient Active Problem List   Diagnosis    Colonic polyp    Diverticulosis    RBBB    Obesity surgery status    Diverticulosis of large intestine without hemorrhage    Class 2 obesity due to excess calories without serious comorbidity in adult    Elevated blood pressure reading    Closed fracture of left proximal humerus       ROS:  Constitutional: denies fever, chills, weight loss  MSK: denies pain in other joints, muscle aches  Neurological: denies numbness, tingling, weakness    Exam:  Appearance: sitting in exam room chair, appears to be in no acute distress, awake and alert  Resp: unlabored breathing on room air  Skin: warm, dry and intact with out erythema or significant increased temperature  Neuro: grossly intact both lower extremities. Intact sensation to light touch. Motor exam 4+ to 5/5 in all major motor groups. LLE: Tender over left side of the sacrum. Examination demonstrates negative logroll and negative Stinchfield. There is brisk capillary refill.   Strength

## 2023-04-28 ENCOUNTER — OFFICE VISIT (OUTPATIENT)
Dept: ORTHOPEDIC SURGERY | Age: 68
End: 2023-04-28

## 2023-04-28 VITALS — HEIGHT: 73 IN | BODY MASS INDEX: 37.37 KG/M2 | WEIGHT: 282 LBS

## 2023-04-28 DIAGNOSIS — S42.292A OTHER CLOSED DISPLACED FRACTURE OF PROXIMAL END OF LEFT HUMERUS, INITIAL ENCOUNTER: Primary | ICD-10-CM

## 2023-04-28 NOTE — PROGRESS NOTES
CHIEF COMPLAINT: Left shoulder pain/ minimally displaced proximal humerus comminuted fracture. DATE OF INJURY: 3/26/2023, DOT 3/31/2023    HISTORY:  Mr. Kareem Muhammad is a 75 y.o.  male right handed who presents today for f/u evaluation of a left shoulder injury. The patient reports that this injury occurred when he fell down steps. He was first seen and evaluated in [place], when he was x-rayed and splinted, and asked to f/u with Orthopedics. The patient denies any other injuries. Rates pain a 3/10 VAS mild, achy, intermittent and show no change. Alleviating factors rest. Movement makes the pain worse, the sling and resting makes the pain better. No numbness or tingling sensation.       Past Medical History:   Diagnosis Date    Colonic polyp     Diverticulosis     Hiatal hernia     RBBB     2010       Past Surgical History:   Procedure Laterality Date    ACHILLES TENDON SURGERY  2014    COLONOSCOPY      COLONOSCOPY N/A 9/29/2021    COLORECTAL CANCER SCREENING, NOT HIGH RISK performed by Gumaro Craig MD at [address] [place] [address] N/A 6/1/2022    ESOPHAGEAL DILATION Vimal Blas performed by Gumaro Craig MD at [address] [place] [address]  05/01/2012    sleeve    UPPER GASTROINTESTINAL ENDOSCOPY N/A 6/1/2022    EGD BIOPSY performed by Gumaro Craig MD at [address] [place] [address] N/A 8/24/2022    EGD BIOPSY performed by Gumaro Craig MD at [facility] History     Socioeconomic History    Marital status:      Spouse name: Not on file    Number of children: Not on file    Years of education: Not on file    Highest education level: Not on file   Occupational History    Not on file   Tobacco Use    Smoking status: Never    Smokeless tobacco: Never   Vaping Use    Vaping Use: Never used   Substance and Sexual Activity    Alcohol use: Yes     Comment: occasionally- wine    Drug use: Never    Sexual activity: Yes

## 2023-05-19 ENCOUNTER — TELEPHONE (OUTPATIENT)
Dept: ORTHOPEDIC SURGERY | Age: 68
End: 2023-05-19

## 2023-05-19 DIAGNOSIS — S42.292A OTHER CLOSED DISPLACED FRACTURE OF PROXIMAL END OF LEFT HUMERUS, INITIAL ENCOUNTER: Primary | ICD-10-CM

## 2023-05-19 NOTE — TELEPHONE ENCOUNTER
General Question     Subject: Requesting an order for PT at [facility].  Patient: Aura Calixto"  Contact Number: [phone]

## 2023-05-22 ENCOUNTER — TELEPHONE (OUTPATIENT)
Dept: ORTHOPEDIC SURGERY | Age: 68
End: 2023-05-22

## 2023-05-22 NOTE — TELEPHONE ENCOUNTER
General Question     Subject: PT FOR SACRUM FX  Patient and /or Facility RequestDane Li  Contact Number: [phone]    PT IS INQUIRING IF PHYSICAL THERAPY IS NEEDED FOR SACRUM FX. PT SEEN DR Marcela Baker ON 4/24/23 TO ADDRESS CONCERN OF LT ARM INJURY. THERE HASN'T BEEN ANY ORDER PLACED FROM CLINICAL REGARDING PT TO ADDRESS THE CONCERN OF VISIT.      PLEASE CONTACT PT AT ABOVE NUMBER

## 2023-05-24 NOTE — PLAN OF CARE
[address] [facility] [place]  Phone: [phone]   Fax:     [phone]                                                        Physical Therapy Certification    Dear Saida Gibbs MD,    We had the pleasure of evaluating the following patient for physical therapy services at [facility]. A summary of our findings can be found in the initial assessment below. This includes our plan of care. If you have any questions or concerns regarding these findings, please do not hesitate to contact me at the office phone number checked above. Thank you for the referral.       Physician Signature:_______________________________Date:__________________  By signing above (or electronic signature), therapists plan is approved by physician      Patient: Jl Landaverde   : 1955   MRN: [id number]  Referring Physician: Saida Gibbs MD      Evaluation Date: 2023      Medical Diagnosis Information:  Medical Diagnosis: Other closed displaced fracture of proximal end of left humerus, initial encounter [address] information: PT Insurance Information: [employer] MEDICARE, [place]40 co-pay, [place], NEEDS AUTH, no CPT exclusions    Precautions/ Contra-indications: Hold on FULL WB  Latex Allergy:   [x]  NO      []YES  Preferred Language for Healthcare:   [x]English       []other:    C-SSRS Triggered by Intake questionnaire (Past 2 wk assessment ):   [x] No, Questionnaire did not trigger screening.   [] Yes, Patient intake triggered C-SSRS Screening      [] C-SSRS Screening completed  [] PCP notified via Epic     SUBJECTIVE: Patient stated complaint:Pt is a 74 yo male presenting to PT with a L proximal humeral fx resulting from a fall down stairs in his home. He lost his footing and landed on his L shoulder. He fx his sacrum as well.

## 2023-05-24 NOTE — FLOWSHEET NOTE
x  [x] TA (67494) x 1     [] [facility] (12534)  [] ES(attended) (41766)     [] ES (un) (21494):   [] Vasopump (57350) [] Ionto (65179)   [] Other:      Approval Dates:  CPT Code Units Approved Units Used  Date Updated:                     GOALS:  Patient stated goal: Reach overhead, return to working out without limitations  [] Progressing: [] Met: [] Not Met: [] Adjusted     Therapist goals for Patient:   Short Term Goals: To be achieved in: 2 weeks  1. Independent in HEP and progression per patient tolerance, in order to prevent re-injury. [] Progressing: [] Met: [] Not Met: [] Adjusted  2. Patient will have a decrease in pain to facilitate improvement in movement, function, and ADLs as indicated by Functional Deficits. [] Progressing: [] Met: [] Not Met: [] Adjusted     Long Term Goals: To be achieved in: 8 weeks  1. Increase UEFI  functional outcome score from 65/80 to 75/80  to assist with reaching prior level of function. [] Progressing: [] Met: [] Not Met: [] Adjusted  2. Patient will demonstrate increased AROM to 180 degrees of flexion/abduction to allow for proper joint functioning as indicated by Functional Deficits. [] Progressing: [] Met: [] Not Met: [] Adjusted  3. Patient will demonstrate an increase in NM recruitment/activation and overall GH and scapular strength to 4+/5 MMT for proper functional mobility as indicated by patients Functional Deficits. [] Progressing: [] Met: [] Not Met: [] Adjusted  4. Patient will return to reaching overhead into kitchen cabinets without increased symptoms or restriction.    [] Progressing: [] Met: [] Not Met: [] Adjusted     ASSESSMENT:  See eval    Treatment/Activity Tolerance:  [x] Patient tolerated treatment well [] Patient limited by fatique  [] Patient limited by pain  [] Patient limited by other medical complications  [] Other:     Overall Progression Towards Functional goals/ Treatment Progress Update:  [] Patient is progressing as expected

## 2023-05-25 ENCOUNTER — HOSPITAL ENCOUNTER (OUTPATIENT)
Dept: PHYSICAL THERAPY | Age: 68
Setting detail: THERAPIES SERIES
Discharge: HOME | End: 2023-05-25
Payer: MEDICARE

## 2023-05-25 PROCEDURE — 97161 PT EVAL LOW COMPLEX 20 MIN: CPT

## 2023-05-25 PROCEDURE — 97530 THERAPEUTIC ACTIVITIES: CPT

## 2023-05-25 PROCEDURE — 97110 THERAPEUTIC EXERCISES: CPT

## 2023-05-30 ENCOUNTER — HOSPITAL ENCOUNTER (OUTPATIENT)
Dept: PHYSICAL THERAPY | Age: 68
Setting detail: THERAPIES SERIES
Discharge: HOME | End: 2023-05-30
Payer: MEDICARE

## 2023-05-30 PROCEDURE — 97110 THERAPEUTIC EXERCISES: CPT

## 2023-05-30 PROCEDURE — 97140 MANUAL THERAPY 1/> REGIONS: CPT

## 2023-05-30 NOTE — FLOWSHEET NOTE
[facility] [place]. [address]  Phone: [phone]   Fax:     [phone]        Physical Therapy Treatment Note/ Progress Report:       Date:  2023    Patient Name:  Renee Canchola    :  1955  MRN: [id number]    Pertinent Medical History: None of significance    Medical/Treatment Diagnosis Information:  Medical Diagnosis: Other displaced fracture of upper end of left humerus, initial encounter for closed fracture [S42.292A]       Insurance/Certification information:   [employer] MEDICARE, $40 co-pay, [place], Ma Part, no CPT exclusions  Physician Information:  Beverly Warren MD  Plan of care signed (Y/N): Y    Date of Patient follow up with Physician: 23     Progress Report: []  Yes  [x]  No     Date Range for reporting period:  Beginnin2023  Ending:      Progress report due (10 Rx/or 30 days whichever is less):      Recertification due (POC duration/ or 90 days whichever is less): 23     Visit # POC/Insurance Allowable Auth Needed   - [x]Yes    []No     Functional Outcomes Measure:    Test: UEFI  Date Assessed Score   23 65/80           Pain level:  0/10     History of Injury:  Pt is a 74 yo male presenting to PT with a L proximal humeral fx resulting from a fall down stairs in his home on 3/26/23. SUBJECTIVE:  Pt states he has been performing his HEP regularly. He hurt his leg at the gym doing too much, but he states his shoulder has been fine with his exercises.      OBJECTIVE:   Observation:   Test measurements:      RESTRICTIONS/PRECAUTIONS: Avoid FULL WB through shoulder    Exercises/Interventions:   Therapeutic Ex (23186)  Min:  Resistance/Reps Cues/Notes   Pulleys- flex/ab 2' each    TP BB 3x 20''    Supine AAROM flexion w/ cane x15 Cues for decreased speed and flexion ROM   Table slides- flex/abd 2x 20 each    SL ER 2x10

## 2023-06-02 ENCOUNTER — HOSPITAL ENCOUNTER (OUTPATIENT)
Dept: PHYSICAL THERAPY | Age: 68
Setting detail: THERAPIES SERIES
Discharge: HOME | End: 2023-06-02
Payer: MEDICARE

## 2023-06-02 PROCEDURE — 97110 THERAPEUTIC EXERCISES: CPT

## 2023-06-02 PROCEDURE — 97140 MANUAL THERAPY 1/> REGIONS: CPT

## 2023-06-02 PROCEDURE — 97112 NEUROMUSCULAR REEDUCATION: CPT

## 2023-06-02 NOTE — FLOWSHEET NOTE
External Rotation at Wall  - 1 x daily - 7 x weekly - 5 reps - 10'' hold    Therapeutic Exercise and NMR EXR  [x] (10074) Provided verbal/tactile cueing for activities related to strengthening, flexibility, endurance, ROM  for improvements in scapular, scapulothoracic and UE control with self care, reaching, carrying, lifting, house/yardwork, driving/computer work. [x] (16168) Provided verbal/tactile cueing for activities related to improving balance, coordination, kinesthetic sense, posture, motor skill, proprioception  to assist with  scapular, scapulothoracic and UE control with self care, reaching, carrying, lifting, house/yardwork, driving/computer work. Therapeutic Activities:    [x] (25971 or 07538) Provided verbal/tactile cueing for activities related to improving balance, coordination, kinesthetic sense, posture, motor skill, proprioception and motor activation to allow for proper function of scapular, scapulothoracic and UE control with self care, carrying, lifting, driving/computer work.      Home Exercise Program:    [x] (66553) Reviewed/Progressed HEP activities related to strengthening, flexibility, endurance, ROM of scapular, scapulothoracic and UE control with self care, reaching, carrying, lifting, house/yardwork, driving/computer work  [x] (76465) Reviewed/Progressed HEP activities related to improving balance, coordination, kinesthetic sense, posture, motor skill, proprioception of scapular, scapulothoracic and UE control with self care, reaching, carrying, lifting, house/yardwork, driving/computer work      Manual Treatments:  PROM / STM / Oscillations-Mobs:  G-I, II, III, IV (PA's, Inf., Post.)  [x] (83249) Provided manual therapy to mobilize soft tissue/joints of cervical/CT, scapular GHJ and UE for the purpose of modulating pain, promoting relaxation,  increasing ROM, reducing/eliminating soft tissue swelling/inflammation/restriction, improving soft tissue extensibility and allowing for

## 2023-06-06 ENCOUNTER — HOSPITAL ENCOUNTER (OUTPATIENT)
Dept: PHYSICAL THERAPY | Age: 68
Setting detail: THERAPIES SERIES
Discharge: HOME | End: 2023-06-06
Payer: MEDICARE

## 2023-06-06 PROCEDURE — 97110 THERAPEUTIC EXERCISES: CPT

## 2023-06-06 PROCEDURE — 97112 NEUROMUSCULAR REEDUCATION: CPT

## 2023-06-06 PROCEDURE — 97140 MANUAL THERAPY 1/> REGIONS: CPT

## 2023-06-06 NOTE — FLOWSHEET NOTE
[facility] [place]. [address]  Phone: [phone]   Fax:     [phone]        Physical Therapy Treatment Note/ Progress Report:       Date:  2023    Patient Name:  Destiney Jenkins    :  1955  MRN: [id number]    Pertinent Medical History: None of significance    Medical/Treatment Diagnosis Information:  Medical Diagnosis: Other displaced fracture of upper end of left humerus, initial encounter for closed fracture [S42.292A]       Insurance/Certification information:   [employer] MEDICARE, [place]40 [place], Nelwyn Carrier, no CPT exclusions  Physician Information:  Jesús Vidales MD  Plan of care signed (Y/N): Y    Date of Patient follow up with Physician: 23     Progress Report: []  Yes  [x]  No     Date Range for reporting period:  Beginnin2023  Ending:      Progress report due (10 Rx/or 30 days whichever is less):      Recertification due (POC duration/ or 90 days whichever is less): 23     Visit # POC/Insurance Allowable Auth Needed   - [x]Yes    []No     Functional Outcomes Measure:    Test: UEFI  Date Assessed Score   23 65/80           Pain level:  0/10     History of Injury:  Pt is a 74 yo male presenting to PT with a L proximal humeral fx resulting from a fall down stairs in his home on 3/26/23. SUBJECTIVE:  Pt states he has been performing his HEP at the gym. He is now able to put his hands on his hips.      OBJECTIVE:   Observation:   Test measurements:      RESTRICTIONS/PRECAUTIONS: Avoid FULL WB through shoulder    Exercises/Interventions:   Therapeutic Ex (36542)  Min:  Resistance/Reps Cues/Notes   Pulleys- flex/ab 2' each    TP BB    AAROM shoulder flex/abd with cane 2x10 each    Supine AAROM flexion w/ cane Cues for decreased speed and flexion ROM   Table slides- flex/abd    Incline slides- flex 3x10    SL ER

## 2023-06-09 ENCOUNTER — OFFICE VISIT (OUTPATIENT)
Dept: ORTHOPEDIC SURGERY | Age: 68
End: 2023-06-09

## 2023-06-09 ENCOUNTER — HOSPITAL ENCOUNTER (OUTPATIENT)
Dept: PHYSICAL THERAPY | Age: 68
Setting detail: THERAPIES SERIES
Discharge: HOME | End: 2023-06-09
Payer: MEDICARE

## 2023-06-09 VITALS — BODY MASS INDEX: 37.37 KG/M2 | HEIGHT: 73 IN | WEIGHT: 282 LBS

## 2023-06-09 DIAGNOSIS — S42.292A OTHER CLOSED DISPLACED FRACTURE OF PROXIMAL END OF LEFT HUMERUS, INITIAL ENCOUNTER: Primary | ICD-10-CM

## 2023-06-09 DIAGNOSIS — M70.22 OLECRANON BURSITIS OF LEFT ELBOW: ICD-10-CM

## 2023-06-09 PROCEDURE — 97140 MANUAL THERAPY 1/> REGIONS: CPT

## 2023-06-09 PROCEDURE — 97110 THERAPEUTIC EXERCISES: CPT

## 2023-06-09 PROCEDURE — 97112 NEUROMUSCULAR REEDUCATION: CPT

## 2023-06-09 RX ORDER — NAPROXEN 500 MG/1
500 TABLET ORAL 2 TIMES DAILY WITH MEALS
Qty: 60 TABLET | Refills: 0 | Status: SHIPPED | OUTPATIENT
Start: 2023-06-09 | End: 2023-07-09

## 2023-06-09 NOTE — PROGRESS NOTES
CHIEF COMPLAINT:  1- Left shoulder pain/ minimally displaced proximal humerus comminuted fracture. 2- Left posterior elbow pain/ Olecranon bursitis. DATE OF INJURY: 3/26/2023, DOT 3/31/2023    HISTORY:  Mr. Kareem Muhammad is a 75 y.o.  male right handed who presents today for f/u evaluation of a left shoulder injury. The patient reports that this injury occurred when he fell down steps. He was first seen and evaluated in [place], when he was x-rayed and splinted, and asked to f/u with Orthopedics. The patient denies any other injuries. Rates pain a 2/10 VAS mild, achy, intermittent and show no change. He is in PT with good improvement. Alleviating factors rest. Movement makes the pain worse, the sling and resting makes the pain better. No numbness or tingling sensation. He also presents today for evaluation of left posterior elbow swelling with minimal pain which started March 2023. He is complaining of achy pain. Pain is increase with moving the elbow. Pain is dull achy pain by the end of the day. Mild radiation to the forearm and no numbness and tingling sensation. No other complaint. No h/o gout.     Past Medical History:   Diagnosis Date    Colonic polyp     Diverticulosis     Hiatal hernia     RBBB     2010       Past Surgical History:   Procedure Laterality Date    ACHILLES TENDON SURGERY  2014    COLONOSCOPY      COLONOSCOPY N/A 9/29/2021    COLORECTAL CANCER SCREENING, NOT HIGH RISK performed by Gumaro Craig MD at [address] [place] [address] 6/1/2022    ESOPHAGEAL DILATION Vimal Blas performed by Gumaro Craig MD at [address] [place] [address]  05/01/2012    sleeve    UPPER GASTROINTESTINAL ENDOSCOPY N/A 6/1/2022    EGD BIOPSY performed by Gumaro Craig MD at [address] [place] [address] N/A 8/24/2022    EGD BIOPSY performed by Gumaro Craig MD at [address] [place] [address] History     Socioeconomic History    Marital

## 2023-06-09 NOTE — FLOWSHEET NOTE
relaxation,  increasing ROM, reducing/eliminating soft tissue swelling/inflammation/restriction, improving soft tissue extensibility and allowing for proper ROM for normal function with self care, reaching, carrying, lifting, house/yardwork, driving/computer work      Modalities:  [] (29924) Vasopneumatic compression: Utilized vasopneumatic compression to decrease edema / swelling for the purpose of improving mobility and quad tone / recruitment which will allow for increased overall function including but not limited to self-care, dressing, driving and transfers. harges:  Timed Code Treatment Minutes: 47   Total Treatment Minutes: 47       [] EVAL (LOW) 86118 (typically 20 minutes face-to-face)  [] EVAL (MOD) 89888 (typically 30 minutes face-to-face)  [] EVAL (HIGH) 14249 (typically 45 minutes face-to-face)  [] RE-EVAL     [x] ZF(37917) x  1   [] Dry needle 1 or 2 Muscles (23451)  [x] NMR (81403) x  1   [] Dry needle 3+ Muscles (02432)  [x] Manual (89651) x  1    [] Ultrasound (71080) x  [] TA (20658) x 1     [] Mech Traction (86364)  [] ES(attended) (96559)     [] ES (un) (90008):   [] Vasopump (81733) [] Ionto (88733)   [] Other:      Approval Dates:  CPT Code Units Approved Units Used  Date Updated:                     GOALS:  Patient stated goal: Reach overhead, return to working out without limitations  [] Progressing: [] Met: [] Not Met: [] Adjusted     Therapist goals for Patient:   Short Term Goals: To be achieved in: 2 weeks  1. Independent in HEP and progression per patient tolerance, in order to prevent re-injury. [] Progressing: [] Met: [] Not Met: [] Adjusted  2. Patient will have a decrease in pain to facilitate improvement in movement, function, and ADLs as indicated by Functional Deficits. [] Progressing: [] Met: [] Not Met: [] Adjusted     Long Term Goals: To be achieved in: 8 weeks  1.   Increase UEFI  functional outcome score from 65/80 to 75/80  to assist with reaching prior level of

## 2023-06-20 ENCOUNTER — HOSPITAL ENCOUNTER (OUTPATIENT)
Dept: PHYSICAL THERAPY | Age: 68
Setting detail: THERAPIES SERIES
Discharge: HOME | End: 2023-06-20
Payer: MEDICARE

## 2023-06-20 PROCEDURE — 97112 NEUROMUSCULAR REEDUCATION: CPT

## 2023-06-20 PROCEDURE — 97110 THERAPEUTIC EXERCISES: CPT

## 2023-06-20 PROCEDURE — 97140 MANUAL THERAPY 1/> REGIONS: CPT

## 2023-06-20 NOTE — FLOWSHEET NOTE
[address] [facility] [place]  Phone: [phone]   Fax:     [phone]        Physical Therapy Treatment Note/ Progress Report:       Date:  2023    Patient Name:  Mallika Whittaker    :  1955  MRN: [id number]    Pertinent Medical History: None of significance    Medical/Treatment Diagnosis Information:  Medical Diagnosis: Other displaced fracture of upper end of left humerus, initial encounter for closed fracture [S42.292A]       Insurance/Certification information:   [employer] MEDICARE, [place]40 [place], [place], [facility], no CPT exclusions  Physician Information:  Zakiya Weeks MD  Plan of care signed (Y/N): Y    Date of Patient follow up with Physician: 23     Progress Report: []  Yes  [x]  No     Date Range for reporting period:  Beginnin2023  Ending:      Progress report due (10 Rx/or 30 days whichever is less): 23 PN NEXT VISIT    Recertification due (POC duration/ or 90 days whichever is less): 23     Visit # POC/Insurance Allowable Auth Needed   - [x]Yes    []No     Functional Outcomes Measure:    Test: UEFI  Date Assessed Score   23 65/80           Pain level:  0/10     History of Injury:  Pt is a 76 yo male presenting to PT with a L proximal humeral fx resulting from a fall down stairs in his home on 3/26/23. SUBJECTIVE: Pt states he has been doing okay with his new exercises at home. No issues with his shoulder recently.   .   OBJECTIVE:   Observation:   Test measurements:       Eval : 23       ROM Left Right   Shoulder Flex 0 WNL   Shoulder Abd 60 WNL   Shoulder ER 30 WNL   Shoulder IR T11 T10                   Strength  Left Right   Shoulder Flex 2+ 5   Shoulder Scap 2+ 5   Shoulder ER 3+ 5   Shoulder IR 4 5       5        RESTRICTIONS/PRECAUTIONS: Avoid FULL WB through shoulder    Exercises/Interventions:   Therapeutic Ex

## 2023-06-23 ENCOUNTER — HOSPITAL ENCOUNTER (OUTPATIENT)
Dept: PHYSICAL THERAPY | Age: 68
Setting detail: THERAPIES SERIES
Discharge: HOME | End: 2023-06-23
Payer: MEDICARE

## 2023-06-23 PROCEDURE — 97110 THERAPEUTIC EXERCISES: CPT

## 2023-06-23 PROCEDURE — 97140 MANUAL THERAPY 1/> REGIONS: CPT

## 2023-06-23 PROCEDURE — 97112 NEUROMUSCULAR REEDUCATION: CPT

## 2023-06-23 NOTE — PROGRESS NOTES
[address] [facility] [place]  Phone: [phone]   Fax:     [phone]        Physical Therapy Treatment Note/ Progress Report:       Date:  2023    Patient Name:  Berlin Hua    :  1955  MRN: [id number]    Pertinent Medical History: None of significance    Medical/Treatment Diagnosis Information:  Medical Diagnosis: Other displaced fracture of upper end of left humerus, initial encounter for closed fracture [S42.292A]       Insurance/Certification information:   [employer] MED[employer]ARE[place], NEEDS AUTH, no CPT exclusions  Physician Information:  Frankie Tran MD  Plan of care signed (Y/N): Y    Date of Patient follow up with Physician: 23     Progress Report: []  Yes  [x]  No     Date Range for reporting period:  Beginnin2023  PN: 23  Ending:      Progress report due (10 Rx/or 30 days whichever is less):     Recertification due (POC duration/ or 90 days whichever is less): 23     Visit # POC/Insurance Allowable Auth Needed   - [x]Yes    []No     Functional Outcomes Measure:    Test: UEFI  Date Assessed Score   23 65/80   23 72/80       Pain level:  0/10     History of Injury:  Pt is a 74 yo male presenting to PT with a L proximal humeral fx resulting from a fall down stairs in his home on 3/26/23. SUBJECTIVE: No updates regarding his shoulder this date.    .   OBJECTIVE:   Observation:   Test measurements:         23       ROM Left Right   Shoulder Flex 70 WNL   Shoulder Abd 85 WNL   Shoulder ER 65 WNL   Shoulder IR T10 T10                   Strength  Left Right   Shoulder Flex 3/5 5/5   Shoulder Scap 3/5 5/5   Shoulder ER 4/5 5/5   Shoulder IR 4+/5 5/5               Eval : 23       ROM Left Right   Shoulder Flex 0 WNL   Shoulder Abd 60 WNL   Shoulder ER 30 WNL   Shoulder IR T11 T10

## 2023-06-27 ENCOUNTER — HOSPITAL ENCOUNTER (OUTPATIENT)
Dept: PHYSICAL THERAPY | Age: 68
Setting detail: THERAPIES SERIES
Discharge: HOME | End: 2023-06-27
Payer: MEDICARE

## 2023-06-27 PROCEDURE — 97140 MANUAL THERAPY 1/> REGIONS: CPT

## 2023-06-27 PROCEDURE — 97112 NEUROMUSCULAR REEDUCATION: CPT

## 2023-06-27 PROCEDURE — 97110 THERAPEUTIC EXERCISES: CPT

## 2023-07-05 ENCOUNTER — HOSPITAL ENCOUNTER (OUTPATIENT)
Dept: PHYSICAL THERAPY | Age: 68
Setting detail: THERAPIES SERIES
Discharge: HOME | End: 2023-07-05
Payer: MEDICARE

## 2023-07-05 PROCEDURE — 97110 THERAPEUTIC EXERCISES: CPT

## 2023-07-05 PROCEDURE — 97112 NEUROMUSCULAR REEDUCATION: CPT

## 2023-07-05 PROCEDURE — 97140 MANUAL THERAPY 1/> REGIONS: CPT

## 2023-07-05 NOTE — FLOWSHEET NOTE
[address] [address]  Phone: [phone]   Fax:     [phone]        Physical Therapy Treatment Note/ Progress Report:       Date:  2023    Patient Name:  Nash Hernandez    :  1955  MRN: [id number]    Pertinent Medical History: None of significance    Medical/Treatment Diagnosis Information:  Medical Diagnosis: Other displaced fracture of upper end of left humerus, initial encounter for closed fracture [S42.951S]       Insurance/Certification information:   [employer] MED[employer]ARE[place], NEEDS AUTH, no CPT exclusions  Physician Information:  Kathryn Pena MD  Plan of care signed (Y/N): Y    Date of Patient follow up with Physician: 23     Progress Report: []  Yes  [x]  No     Date Range for reporting period:  Beginnin2023  PN: 23  Ending:      Progress report due (10 Rx/or 30 days whichever is less):     Recertification due (POC duration/ or 90 days whichever is less): 23     Visit # POC/Insurance Allowable Auth Needed   10/ 16 5/26- [x]Yes    []No     Functional Outcomes Measure:    Test: UEFI  Date Assessed Score   23 65/80   23 72/80       Pain level:  0/10     History of Injury:  Pt is a 74 yo male presenting to PT with a L proximal humeral fx resulting from a fall down stairs in his home on 3/26/23. SUBJECTIVE:  Pt states he is now able to fully lift shoulder with lateral wall walks and he can drive with both arms now   .    OBJECTIVE:   Observation:   Test measurements:         23       ROM Left Right   Shoulder Flex 70 WNL   Shoulder Abd 85 WNL   Shoulder ER 65 WNL   Shoulder IR T10 T10                   Strength  Left Right   Shoulder Flex 3/5 5/5   Shoulder Scap 3/5 5/5   Shoulder ER 4/5 5/5   Shoulder IR 4+/5 5/5               Eval : 23       ROM Left Right   Shoulder Flex 0 WNL   Shoulder Abd 60

## 2023-07-17 NOTE — ANESTHESIA PRE PROCEDURE
[facility] Department of Anesthesiology  Pre-Anesthesia Evaluation/Consultation       Name:  Mohsen Szymanski  : 1955  Age:  78 y.o. MRN:  [id number]  Date: 2022           Surgeon: Surgeon(s):  Shannon York MD    Procedure: Procedure(s):  EGD ESOPHAGOGASTRODUODENOSCOPY     No Known Allergies  Patient Active Problem List   Diagnosis    Colonic polyp    Diverticulosis    RBBB    Obesity surgery status    Diverticulosis of large intestine without hemorrhage    Class 2 obesity due to excess calories without serious comorbidity in adult    Elevated blood pressure reading     Past Medical History:   Diagnosis Date    Colonic polyp     Diverticulosis     Hiatal hernia     RBBB          Past Surgical History:   Procedure Laterality Date    ACHILLES TENDON SURGERY      COLONOSCOPY      COLONOSCOPY N/A 2021    COLORECTAL CANCER SCREENING, NOT HIGH RISK performed by Shannon York MD at [address] N/A 2022    [address] performed by Shannon York MD at [address]  2012    sleeve    UPPER GASTROINTESTINAL ENDOSCOPY N/A 2022    EGD BIOPSY performed by Shannon York MD at [address] [facility] [address] History     Tobacco Use    Smoking status: Never    Smokeless tobacco: Never   Vaping Use    Vaping Use: Never used   Substance Use Topics    Alcohol use: Yes     Comment: occasionally- wine    Drug use: Never     Medications  No current facility-administered medications on file prior to encounter.      Current Outpatient Medications on File Prior to Encounter   Medication Sig Dispense Refill    omeprazole (PRILOSEC) 40 MG delayed release capsule Take 40 mg by mouth in the morning and at bedtime      Multiple Vitamins-Minerals (THERAPEUTIC MULTIVITAMIN-MINERALS) tablet Take 1 tablet by mouth daily      aspirin EC 81 MG EC tablet Take 1 tablet by mouth daily 90 tablet 1     Current Facility-Administered Medications   Medication Dose Route Frequency Provider Last Rate Last Admin    sodium chloride flush 0.9 % injection 5-40 mL  5-40 mL IntraVENous 2 times per day Tiny Valentino MD        sodium chloride flush 0.9 % injection 5-40 mL  5-40 mL IntraVENous PRN Tiny Valentino MD        0.9 % sodium chloride infusion   IntraVENous PRN Tiny Valentino  mL/hr at 22 0741 NoRateChange at 22 0741     Vital Signs (Current)   Vitals:    22 07   BP: 132/80   Pulse: 55   Resp: 16   Temp: 97 °F (36.1 °C)   SpO2: 97%     Vital Signs Statistics (for past 48 hrs)     Temp  Av °F (36.1 °C)  Min: 97 °F (36.1 °C)   Min taken time: 22  Max: 97 °F (36.1 °C)   Max taken time: 22  Pulse  Av  Min: 54   Min taken time: 22 07  Max: 54   Max taken time: 22 0709  Resp  Av  Min: 12   Min taken time: 22 07  Max: 12   Max taken time: 22 07  BP  Min: 132/80   Min taken time: 22 0709  Max: 132/80   Max taken time: 22 0709  SpO2  Av %  Min: 97 %   Min taken time: 22 07  Max: 97 %   Max taken time: 22 0709    BP Readings from Last 3 Encounters:   22 132/80   22 110/67   22 112/69     BMI  Body mass index is 35.89 kg/m². Estimated body mass index is 35.89 kg/m² as calculated from the following:    Height as of this encounter: 6' 1\" (1.854 m). Weight as of this encounter: 272 lb (123.4 kg).     CBC   Lab Results   Component Value Date/Time    WBC 5.1 2021 09:19 AM    RBC 4.46 2021 09:19 AM    HGB 14.7 2021 09:19 AM    HCT 42.5 2021 09:19 AM    MCV 95.3 2021 09:19 AM    RDW 13.0 2021 09:19 AM     2021 09:19 AM     CMP    Lab Results   Component Value Date/Time     2022 11:06 AM    K 5.0 2022 11:06 AM     2022 11:06 AM    CO2 23 2022 11:06 AM    BUN 9 2022 11:06 AM CREATININE 0.9 03/24/2022 11:06 AM    GFRAA >60 03/24/2022 11:06 AM    GFRAA >60 12/18/2012 12:04 PM    AGRATIO 1.8 02/18/2021 09:19 AM    LABGLOM >60 03/24/2022 11:06 AM    GLUCOSE 89 03/24/2022 11:06 AM    PROT 6.7 02/18/2021 09:19 AM    PROT 6.9 12/18/2012 12:04 PM    CALCIUM 9.3 03/24/2022 11:06 AM    BILITOT 0.9 02/18/2021 09:19 AM    ALKPHOS 84 02/18/2021 09:19 AM    AST 23 02/18/2021 09:19 AM    ALT 19 02/18/2021 09:19 AM     BMP    Lab Results   Component Value Date/Time     03/24/2022 11:06 AM    K 5.0 03/24/2022 11:06 AM     03/24/2022 11:06 AM    CO2 23 03/24/2022 11:06 AM    BUN 9 03/24/2022 11:06 AM    CREATININE 0.9 03/24/2022 11:06 AM    CALCIUM 9.3 03/24/2022 11:06 AM    GFRAA >60 03/24/2022 11:06 AM    GFRAA >60 12/18/2012 12:04 PM    LABGLOM >60 03/24/2022 11:06 AM    GLUCOSE 89 03/24/2022 11:06 AM     POCGlucose  No results for input(s): GLUCOSE in the last 72 hours.    Coags  No results found for: PROTIME, INR, APTT  HCG (If Applicable) No results found for: PREGTESTUR, PREGSERUM, HCG, HCGQUANT   ABGs No results found for: PHART, PO2ART, TWF6JCW, GQU0XJS, BEART, T7GXKUBZ   Type & Screen (If Applicable)  No results found for: LABABO, LABRH                         BMI: Wt Readings from Last 3 Encounters:       NPO Status:   Date of last liquid consumption: 08/23/22   Time of last liquid consumption: 2100   Date of last solid food consumption: 08/23/22      Time of last solid consumption: 2100       Anesthesia Evaluation  Patient summary reviewed no history of anesthetic complications:   Airway: Mallampati: III  TM distance: >3 FB   Neck ROM: full  Mouth opening: > = 3 FB   Dental: normal exam         Pulmonary:Negative Pulmonary ROS and normal exam                               Cardiovascular:  Exercise tolerance: good (>4 METS),   (+) dysrhythmias (RBBB):,       ECG reviewed  Rhythm: regular  Rate: normal           Beta Blocker:  Not on Beta Blocker         Neuro/Psych:   Negative Neuro/Psych ROS              GI/Hepatic/Renal:   (+) hiatal hernia, GERD: well controlled,           Endo/Other: Negative Endo/Other ROS                    Abdominal:   (+) obese,           Vascular: negative vascular ROS. Other Findings:           Anesthesia Plan      MAC     ASA 3       Induction: intravenous. Anesthetic plan and risks discussed with patient. Plan discussed with CRNA. This pre-anesthesia assessment may be used as a history and physical.    DOS STAFF ADDENDUM:    Pt seen and examined, chart reviewed (including anesthesia, drug and allergy history). No interval changes to history and physical examination. Anesthetic plan, risks, benefits, alternatives, and personnel involved discussed with patient. Questions and concerns addressed. Patient(family) verbalized an understanding and agrees to proceed.       Marcelino Jaimes MD  August 24, 2022  7:45 AM Burow's Graft Text: The defect edges were debeveled with a #15 scalpel blade.  Given the location of the defect, shape of the defect, the proximity to free margins and the presence of a standing cone deformity a Burow's skin graft was deemed most appropriate. The standing cone was removed and this tissue was then trimmed to the shape of the primary defect. The adipose tissue was also removed until only dermis and epidermis were left.  The skin margins of the secondary defect were undermined to an appropriate distance in all directions utilizing iris scissors.  The secondary defect was closed with interrupted buried subcutaneous sutures.  The skin edges were then re-apposed with running  sutures.  The skin graft was then placed in the primary defect and oriented appropriately.

## 2023-07-18 ENCOUNTER — HOSPITAL ENCOUNTER (OUTPATIENT)
Dept: PHYSICAL THERAPY | Age: 68
Setting detail: THERAPIES SERIES
Discharge: HOME | End: 2023-07-18
Payer: MEDICARE

## 2023-07-18 PROCEDURE — 97112 NEUROMUSCULAR REEDUCATION: CPT

## 2023-07-18 PROCEDURE — 97140 MANUAL THERAPY 1/> REGIONS: CPT

## 2023-07-18 PROCEDURE — 97110 THERAPEUTIC EXERCISES: CPT

## 2023-07-20 ENCOUNTER — HOSPITAL ENCOUNTER (OUTPATIENT)
Dept: PHYSICAL THERAPY | Age: 68
Setting detail: THERAPIES SERIES
Discharge: HOME | End: 2023-07-20
Payer: MEDICARE

## 2023-07-20 PROCEDURE — 97140 MANUAL THERAPY 1/> REGIONS: CPT

## 2023-07-20 PROCEDURE — 97112 NEUROMUSCULAR REEDUCATION: CPT

## 2023-07-20 PROCEDURE — 97110 THERAPEUTIC EXERCISES: CPT

## 2023-07-20 NOTE — FLOWSHEET NOTE
[address] [address]  Phone: [phone]   Fax:     [phone]            Physical Therapy Treatment Note/ Progress Report:       Date:  2023    Patient Name:  Natalie Moreira    :  1955  MRN: [id number]    Pertinent Medical History: None of significance    Medical/Treatment Diagnosis Information:  Medical Diagnosis: Other displaced fracture of upper end of left humerus, initial encounter for closed fracture [S42.292H]       Insurance/Certification information:   [employer] MED[employer]ARE[place]40 [place], NEEDS AUTH, no CPT exclusions  Physician Information:  Lorraine Tomlin MD  Plan of care signed (Y/N): Y    Date of Patient follow up with Physician: 23     Progress Report: []  Yes  [x]  No     Date Range for reporting period:  Beginnin2023  PN: 23  PN: 23  Ending:      Progress report due (10 Rx/or 30 days whichever is less): 29    Recertification due (POC duration/ or 90 days whichever is less): 23     Visit # POC/Insurance Allowable Auth Needed   - [x]Yes    []No     Functional Outcomes Measure:    Test: UEFI  Date Assessed Score   23 65/80   23 72/80   23 73/80       Pain level:  0/10     History of Injury:  Pt is a 76 yo male presenting to PT with a L proximal humeral fx resulting from a fall down stairs in his home on 3/26/23. SUBJECTIVE:  Pt states he is now able to circles with the ball on the wall exercise. He has minimal pinching in his shoulder  .    OBJECTIVE:   Observation:   Test measurements:       23       ROM Left Right   Shoulder Flex 104 WNL   Shoulder Abd 100 WNL   Shoulder ER 55 WNL   Shoulder IR T9 T10                   Strength  Left Right   Shoulder Flex 3/5 5/5   Shoulder Scap 3/5 5/5   Shoulder ER 4/5 5/5   Shoulder IR 4+/5 5/5               23       ROM Left Right   Shoulder

## 2023-07-20 NOTE — PROGRESS NOTES
[address] [address]  Phone: [phone]   Fax:     [phone]        Physical Therapy Treatment Note/ Progress Report:       Date:  2023    Patient Name:  Clair San    :  1955  MRN: [id number]    Pertinent Medical History: None of significance    Medical/Treatment Diagnosis Information:  Medical Diagnosis: Other displaced fracture of upper end of left humerus, initial encounter for closed fracture [S42.590N]       Insurance/Certification information:   [employer] MED[employer]ARE[place], NEEDS AUTH, no CPT exclusions  Physician Information:  Ozzy Allen MD  Plan of care signed (Y/N): Y    Date of Patient follow up with Physician: 23     Progress Report: [x]  Yes  []  No     Date Range for reporting period:  Beginnin2023  PN: 23  PN: 23  Ending:      Progress report due (10 Rx/or 30 days whichever is less): 3/87/24    Recertification due (POC duration/ or 90 days whichever is less): 23     Visit # POC/Insurance Allowable Auth Needed   - [x]Yes    []No     Functional Outcomes Measure:    Test: UEFI  Date Assessed Score   23 65/80   23 72/80   23        Pain level:  0/10     History of Injury:  Pt is a 74 yo male presenting to PT with a L proximal humeral fx resulting from a fall down stairs in his home on 3/26/23. SUBJECTIVE:  Pt states he is now able to circles with the ball on the wall exercise. He has minimal pinching in his shoulder  .    OBJECTIVE:   Observation:   Test measurements:       23       ROM Left Right   Shoulder Flex 82 WNL   Shoulder Abd 86 WNL   Shoulder ER 72 WNL   Shoulder IR T10 T9                   Strength  Left Right   Shoulder Flex 4-/5 5/5   Shoulder Scap 4-/5 5/5   Shoulder ER 4+/5 5/5   Shoulder IR 4+/5 5/5               23       ROM Left Right   Shoulder Flex 70 WNL

## 2023-07-21 ENCOUNTER — OFFICE VISIT (OUTPATIENT)
Dept: ORTHOPEDIC SURGERY | Age: 68
End: 2023-07-21

## 2023-07-21 VITALS — HEIGHT: 73 IN | WEIGHT: 282 LBS | BODY MASS INDEX: 37.37 KG/M2

## 2023-07-21 DIAGNOSIS — S42.292A OTHER CLOSED DISPLACED FRACTURE OF PROXIMAL END OF LEFT HUMERUS, INITIAL ENCOUNTER: Primary | ICD-10-CM

## 2023-07-22 NOTE — PROGRESS NOTES
CHIEF COMPLAINT:  1- Left shoulder pain/ minimally displaced proximal humerus comminuted fracture. 2- Left posterior elbow pain/ Olecranon bursitis. DATE OF INJURY: 3/26/2023, DOT 3/31/2023    HISTORY:  Mr. Taina Ayala is a 75 y.o.  male right handed who presents today for f/u evaluation of a left shoulder injury. The patient reports that this injury occurred when he fell down steps. He was first seen and evaluated in [facility], when he was x-rayed and splinted, and asked to f/u with Orthopedics. The patient denies any other injuries. Rates pain a 8/10 VAS mild, achy, intermittent and show no change. He is in PT with good improvement. Alleviating factors rest. Movement makes the pain worse, the sling and resting makes the pain better. No numbness or tingling sensation. He also presents today for evaluation of left posterior elbow swelling with minimal pain which started March 2023. He is complaining of achy pain. Pain is increase with moving the elbow. Pain is dull achy pain by the end of the day. Mild radiation to the forearm and no numbness and tingling sensation. No other complaint. No h/o gout.     Past Medical History:   Diagnosis Date    Colonic polyp     Diverticulosis     Hiatal hernia     RBBB     2010       Past Surgical History:   Procedure Laterality Date    ACHILLES TENDON SURGERY  2014    COLONOSCOPY      COLONOSCOPY N/A 9/29/2021    COLORECTAL CANCER SCREENING, NOT HIGH RISK [facility] 6/1/2022    ESOPHAGEAL DILATION Ilsa Mac performed by Aldair Staples MD at [address]  05/01/2012    sleeve    UPPER GASTROINTESTINAL ENDOSCOPY N/A 6/1/2022    EGD BIOPSY performed by Aldair Staples MD at [address] [facility] N/A 8/24/2022    EGD BIOPSY performed by Aldair Staples MD at [address] History     Socioeconomic History    Marital

## 2023-09-22 ENCOUNTER — OFFICE VISIT (OUTPATIENT)
Dept: ORTHOPEDIC SURGERY | Age: 68
End: 2023-09-22

## 2023-09-22 VITALS — HEIGHT: 73 IN | BODY MASS INDEX: 35.65 KG/M2 | WEIGHT: 269 LBS

## 2023-09-22 DIAGNOSIS — M25.512 ACUTE PAIN OF LEFT SHOULDER: Primary | ICD-10-CM

## 2023-09-22 DIAGNOSIS — S42.292K OTHER CLOSED DISPLACED FRACTURE OF PROXIMAL END OF LEFT HUMERUS WITH NONUNION, SUBSEQUENT ENCOUNTER: ICD-10-CM

## 2023-09-22 NOTE — PROGRESS NOTES
CHIEF COMPLAINT:  1- Left shoulder pain/ minimally displaced proximal humerus comminuted fracture, nonunion. 2- Left posterior elbow pain/ Olecranon bursitis. DATE OF INJURY: 3/26/2023, DOT 3/31/2023    HISTORY:  Mr. Debra Odell is a 70 y.o.  male right handed who presents today for f/u evaluation of a left shoulder injury. The patient reports that this injury occurred when he fell down steps. He was first seen and evaluated in [facility], when he was x-rayed and splinted, and asked to f/u with Orthopedics. The patient denies any other injuries. Rates pain a 2/10 VAS mild, achy, intermittent and somewhat improved. He has completed PT with good improvement. Alleviating factors rest. Movement overhead or when carrying boxes makes the pain worse and resting makes the pain better. No numbness or tingling sensation. He also presents today for evaluation of left posterior elbow swelling with minimal pain which started March 2023. He states this is improving. He no longer is complaining of achy pain. Mild radiation to the forearm and no numbness and tingling sensation. No other complaint. No h/o gout.     Past Medical History:   Diagnosis Date    Colonic polyp     Diverticulosis     Hiatal hernia     RBBB     2010       Past Surgical History:   Procedure Laterality Date    ACHILLES TENDON SURGERY  2014    COLONOSCOPY      COLONOSCOPY N/A 9/29/2021    COLORECTAL CANCER SCREENING, NOT HIGH RISK performed [facility] Don[facility] 6/1/2022    ESOPHAGEAL DILATION Kleber Lino performed by Doni Mosqueda MD at [address]  05/01/2012    sleeve    UPPER GASTROINTESTINAL ENDOSCOPY N/A 6/1/2022    EGD BIOPSY performed by Doni Mosqueda MD at [address] [facility] N/A 8/24/2022    EGD BIOPSY performed by Doni Mosqueda MD at [address] [facility] [address] History     Socioeconomic History    Marital status:

## 2023-11-21 ENCOUNTER — TELEPHONE (OUTPATIENT)
Dept: ORTHOPEDIC SURGERY | Age: 68
End: 2023-11-21

## 2023-11-21 NOTE — TELEPHONE ENCOUNTER
LM to reschedule appt. Due to Dr. Medina Sos needing to be in surgery. If patient calls back and is doing well please reschedule for next week.

## 2023-12-13 ENCOUNTER — TELEPHONE (OUTPATIENT)
Dept: ORTHOPEDIC SURGERY | Age: 68
End: 2023-12-13

## 2023-12-13 ENCOUNTER — OFFICE VISIT (OUTPATIENT)
Dept: ORTHOPEDIC SURGERY | Age: 68
End: 2023-12-13
Payer: MEDICARE

## 2023-12-13 VITALS — WEIGHT: 277 LBS | HEIGHT: 72 IN | BODY MASS INDEX: 37.52 KG/M2

## 2023-12-13 DIAGNOSIS — M70.22 OLECRANON BURSITIS OF LEFT ELBOW: ICD-10-CM

## 2023-12-13 DIAGNOSIS — S42.292K OTHER CLOSED DISPLACED FRACTURE OF PROXIMAL END OF LEFT HUMERUS WITH NONUNION, SUBSEQUENT ENCOUNTER: Primary | ICD-10-CM

## 2023-12-13 PROCEDURE — G8484 FLU IMMUNIZE NO ADMIN: HCPCS | Performed by: ORTHOPAEDIC SURGERY

## 2023-12-13 PROCEDURE — 99213 OFFICE O/P EST LOW 20 MIN: CPT | Performed by: ORTHOPAEDIC SURGERY

## 2023-12-13 PROCEDURE — 1036F TOBACCO NON-USER: CPT | Performed by: ORTHOPAEDIC SURGERY

## 2023-12-13 PROCEDURE — G8417 CALC BMI ABV UP PARAM F/U: HCPCS | Performed by: ORTHOPAEDIC SURGERY

## 2023-12-13 PROCEDURE — 1123F ACP DISCUSS/DSCN MKR DOCD: CPT | Performed by: ORTHOPAEDIC SURGERY

## 2023-12-13 PROCEDURE — 3017F COLORECTAL CA SCREEN DOC REV: CPT | Performed by: ORTHOPAEDIC SURGERY

## 2023-12-13 PROCEDURE — G8427 DOCREV CUR MEDS BY ELIG CLIN: HCPCS | Performed by: ORTHOPAEDIC SURGERY

## 2023-12-13 NOTE — TELEPHONE ENCOUNTER
Left a voicemail stating that I will send in a request for more Bone stim sessions and the company should reach out to him.

## 2023-12-13 NOTE — PROGRESS NOTES
ENDOSCOPY       Social History     Socioeconomic History    Marital status:      Spouse name: Not on file    Number of children: Not on file    Years of education: Not on file    Highest education level: Not on file   Occupational History    Not on file   Tobacco Use    Smoking status: Never    Smokeless tobacco: Never   Vaping Use    Vaping Use: Never used   Substance and Sexual Activity    Alcohol use: Yes     Comment: occasionally- wine    Drug use: Never    Sexual activity: Yes     Partners: Female   Other Topics Concern    Not on file   Social History Narrative    Not on file     Social Determinants of Health     Financial Resource Strain: Low Risk  (4/20/2023)    Overall Financial Resource Strain (CARDIA)     Difficulty of Paying Living Expenses: Not hard at all   Food Insecurity: Not on file (4/20/2023)   Transportation Needs: Unknown (4/20/2023)    PRAPARE - Transportation     Lack of Transportation (Medical): Not on file     Lack of Transportation (Non-Medical):  No   Physical Activity: Sufficiently Active (7/31/2023)    Exercise Vital Sign     Days of Exercise per Week: 7 days     Minutes of Exercise per Session: 80 min   Stress: Not on file   Social Connections: Not on file   Intimate Partner Violence: Not At Risk (4/21/2023)    Humiliation, Afraid, Rape, and Kick questionnaire     Fear of Current or Ex-Partner: No     Emotionally Abused: No     Physically Abused: No     Sexually Abused: No   Housing Stability: Unknown (4/20/2023)    Housing Stability Vital Sign     Unable to Pay for Housing in the Last Year: Not on file     Number of [address] in the Last Year: Not on file     Unstable Housing in the Last Year: No       Family History   Problem Relation Age of Onset    Other Mother         MVA    Heart Failure Father     Other Brother         Prostate cancer       Current Outpatient Medications on File Prior to Visit   Medication Sig Dispense Refill    omeprazole (PRILOSEC) 40 MG delayed release

## 2023-12-13 NOTE — TELEPHONE ENCOUNTER
General Question     Subject: BONE STIMULATOR  Patient and /or Facility Request: Nav Marques"   Contact Number: [phone]     PATIENT STATES HE WILL RUN OUT OF HIS BONE STIMULATOR BEFORE HIS NEXT APPT WITH DR MCFADDEN     PATIENT WOULD KNOW THE PROCESS TO GET A NEW ONE     PATIENT CAN BE REACHED AT THE NUMBER LISTED ABOVE

## 2024-01-03 ENCOUNTER — TELEPHONE (OUTPATIENT)
Dept: ORTHOPEDIC SURGERY | Age: 69
End: 2024-01-03

## 2024-01-03 NOTE — TELEPHONE ENCOUNTER
Reaching out to Tea for more information.   Called to inform patient that I am waiting to hear back from our Rep. I will call him back when I have more information.

## 2024-01-03 NOTE — TELEPHONE ENCOUNTER
General Question     Subject: L SHOULDER EXPEGON MACHINE   Patient and /or Facility Request: John Johnson \"Jay\"  Contact Number: [phone]    PATIENT CALLED IN TO SEE IF HE CAN SPEAK TO SOMEONE IN THE OFFICE ABOUT NT HEARING BACK FROM EXPEGON MACHINE ABOUT HIS SHOULDER...    PLEASE ADVISE

## 2024-01-04 NOTE — TELEPHONE ENCOUNTER
Left a voicemail for patient to return call concerning the bone stimulator. Checking to see if Tea spoke with him yesterday.     Spoke with Tea who said she would reach out to patient concerning his treatments. She believes he should have enough treatments for 1 year and that sometimes the numbers are misleading.

## 2024-01-04 NOTE — TELEPHONE ENCOUNTER
Pt RETURNING CALL. WILL CONNECT IN TEAMS AND ATTEMPT TO REACH OUT, AND SEND ANOTHER TE IF UNABLE TO MAKE CONTACT.

## 2024-03-06 ENCOUNTER — OFFICE VISIT (OUTPATIENT)
Dept: ORTHOPEDIC SURGERY | Age: 69
End: 2024-03-06
Payer: MEDICARE

## 2024-03-06 DIAGNOSIS — S42.292K OTHER CLOSED DISPLACED FRACTURE OF PROXIMAL END OF LEFT HUMERUS WITH NONUNION, SUBSEQUENT ENCOUNTER: Primary | ICD-10-CM

## 2024-03-06 PROCEDURE — G8417 CALC BMI ABV UP PARAM F/U: HCPCS | Performed by: ORTHOPAEDIC SURGERY

## 2024-03-06 PROCEDURE — 99213 OFFICE O/P EST LOW 20 MIN: CPT | Performed by: ORTHOPAEDIC SURGERY

## 2024-03-06 PROCEDURE — 1036F TOBACCO NON-USER: CPT | Performed by: ORTHOPAEDIC SURGERY

## 2024-03-06 PROCEDURE — G8427 DOCREV CUR MEDS BY ELIG CLIN: HCPCS | Performed by: ORTHOPAEDIC SURGERY

## 2024-03-06 PROCEDURE — 1123F ACP DISCUSS/DSCN MKR DOCD: CPT | Performed by: ORTHOPAEDIC SURGERY

## 2024-03-06 PROCEDURE — G8484 FLU IMMUNIZE NO ADMIN: HCPCS | Performed by: ORTHOPAEDIC SURGERY

## 2024-03-06 PROCEDURE — 3017F COLORECTAL CA SCREEN DOC REV: CPT | Performed by: ORTHOPAEDIC SURGERY

## 2024-03-06 NOTE — PROGRESS NOTES
CHIEF COMPLAINT:  1- Left shoulder pain/ minimally displaced proximal humerus comminuted fracture, nonunion.  2- Left posterior elbow pain/ Olecranon bursitis, much better.    DATE OF INJURY: 3/26/2023     HISTORY:  Mr. Johnson is a 68 y.o.  male right handed who presents today for f/u evaluation of a left shoulder injury.  The patient reports that this injury occurred when he fell down steps.  He was first seen and evaluated in [place], when he was x-rayed and splinted, and asked to f/u with Orthopedics. The patient denies any other injuries. Rates pain a 2/10 VAS mild, achy, intermittent and somewhat improved. He has completed PT with good improvement. Alleviating factors rest. Movement overhead or when carrying boxes makes the pain worse and resting makes the pain better.  No numbness or tingling sensation.  He started using Exogen bone stimulator in Oct 2023 to help in bone union.     He also presents today for f/u evaluation of left posterior elbow swelling with minimal pain which started March 2023.  He states this is improving.  He no longer is complaining of achy pain. Mild radiation to the forearm and no numbness and tingling sensation. No other complaint.  No h/o gout.    Past Medical History:   Diagnosis Date    Colonic polyp     Diverticulosis     Hiatal hernia     RBBB     2010       Past Surgical History:   Procedure Laterality Date    ACHILLES TENDON SURGERY  2014    COLONOSCOPY      COLONOSCOPY N/A 9/29/2021    COLORECTAL CANCER SCREENING, NOT HIGH RISK performed by Kevin Perrin MD at [facility] ENDOSCOPY    ESOPHAGEAL DILATATION N/A 6/1/2022    ESOPHAGEAL DILATION LUQUE performed by Kevin Perrin MD at [facility] ENDOSCOPY    GASTRIC RESTRICTION SURGERY  05/01/2012    sleeve    UPPER GASTROINTESTINAL ENDOSCOPY N/A 6/1/2022    EGD BIOPSY performed by Kevin Perrin MD at [facility] ENDOSCOPY    UPPER GASTROINTESTINAL ENDOSCOPY N/A 8/24/2022    EGD BIOPSY performed by Kevin Perrin MD at [facility]

## 2024-06-07 ENCOUNTER — OFFICE VISIT (OUTPATIENT)
Dept: ORTHOPEDIC SURGERY | Age: 69
End: 2024-06-07

## 2024-06-07 DIAGNOSIS — M70.22 OLECRANON BURSITIS OF LEFT ELBOW: ICD-10-CM

## 2024-06-07 DIAGNOSIS — S42.292K OTHER CLOSED DISPLACED FRACTURE OF PROXIMAL END OF LEFT HUMERUS WITH NONUNION, SUBSEQUENT ENCOUNTER: Primary | ICD-10-CM

## 2024-06-07 NOTE — PROGRESS NOTES
MALINA Perrin MD at [facility] ENDOSCOPY       Social History     Socioeconomic History    Marital status:      Spouse name: Not on file    Number of children: Not on file    Years of education: Not on file    Highest education level: Not on file   Occupational History    Not on file   Tobacco Use    Smoking status: Never    Smokeless tobacco: Never   Vaping Use    Vaping Use: Never used   Substance and Sexual Activity    Alcohol use: Yes     Comment: occasionally- wine    Drug use: Never    Sexual activity: Yes     Partners: Female   Other Topics Concern    Not on file   Social History Narrative    Not on file     Social Determinants of Health     Financial Resource Strain: Low Risk  (4/20/2023)    Overall Financial Resource Strain (CARDIA)     Difficulty of Paying Living Expenses: Not hard at all   Food Insecurity: Not on file (4/20/2023)   Transportation Needs: Unknown (4/20/2023)    PRAPARE - Transportation     Lack of Transportation (Medical): Not on file     Lack of Transportation (Non-Medical): No   Physical Activity: Sufficiently Active (7/31/2023)    Exercise Vital Sign     Days of Exercise per Week: 7 days     Minutes of Exercise per Session: 80 min   Stress: Not on file   Social Connections: Not on file   Intimate Partner Violence: Not At Risk (4/21/2023)    Humiliation, Afraid, Rape, and Kick questionnaire     Fear of Current or Ex-Partner: No     Emotionally Abused: No     Physically Abused: No     Sexually Abused: No   Housing Stability: Unknown (4/20/2023)    Housing Stability Vital Sign     Unable to Pay for Housing in the Last Year: Not on file     Number of Places Lived in the Last Year: Not on file     Unstable Housing in the Last Year: No       Family History   Problem Relation Age of Onset    Other Mother         MVA    Heart Failure Father     Other Brother         Prostate cancer       Current Outpatient Medications on File Prior to Visit   Medication Sig Dispense Refill    amLODIPine (NORVASC)

## 2024-09-11 ENCOUNTER — OFFICE VISIT (OUTPATIENT)
Dept: ORTHOPEDIC SURGERY | Age: 69
End: 2024-09-11
Payer: MEDICARE

## 2024-09-11 VITALS — HEIGHT: 72 IN | WEIGHT: 276.9 LBS | BODY MASS INDEX: 37.5 KG/M2

## 2024-09-11 DIAGNOSIS — M25.512 ACUTE PAIN OF LEFT SHOULDER: Primary | ICD-10-CM

## 2024-09-11 DIAGNOSIS — S42.292A OTHER CLOSED DISPLACED FRACTURE OF PROXIMAL END OF LEFT HUMERUS, INITIAL ENCOUNTER: ICD-10-CM

## 2024-09-11 DIAGNOSIS — M70.22 OLECRANON BURSITIS OF LEFT ELBOW: ICD-10-CM

## 2024-09-11 PROCEDURE — 1036F TOBACCO NON-USER: CPT | Performed by: ORTHOPAEDIC SURGERY

## 2024-09-11 PROCEDURE — G8417 CALC BMI ABV UP PARAM F/U: HCPCS | Performed by: ORTHOPAEDIC SURGERY

## 2024-09-11 PROCEDURE — 99213 OFFICE O/P EST LOW 20 MIN: CPT | Performed by: ORTHOPAEDIC SURGERY

## 2024-09-11 PROCEDURE — G8427 DOCREV CUR MEDS BY ELIG CLIN: HCPCS | Performed by: ORTHOPAEDIC SURGERY

## 2024-09-11 PROCEDURE — 3017F COLORECTAL CA SCREEN DOC REV: CPT | Performed by: ORTHOPAEDIC SURGERY

## 2024-09-11 PROCEDURE — 1123F ACP DISCUSS/DSCN MKR DOCD: CPT | Performed by: ORTHOPAEDIC SURGERY

## 2024-12-23 NOTE — PROGRESS NOTES
[facility] ENDOSCOPY EGD PRE-OPERATIVE INSTRUCTIONS    Procedure date_08/24/2022________Arrival time___0700_________        Surgery time___0800_________       Nothing by mouth after midnight the night before the procedure. This includes water chewing gum, mints and ice chips. You may brush your teeth and gargle the morning of your surgery, but do not swallow the water    You may be asked to stop blood thinners such as Coumadin, Plavix, Fragmin, Lovenox, etc., or any anti-inflammatories such as:  Aspirin, Ibuprofen, Advil, Naproxen prior to your procedure. We also ask that you stop any OTC medications such as fish oil, vitamin E, glucosamine, garlic, Multivitamins, COQ 10, etc.    You must make arrangements for a responsible adult to arrive with you and stay in our waiting area during your procedure. They will also need to take you home after your procedure. For your safety you will not be allowed to leave alone or drive yourself home. Also for your safety, it is strongly suggested that someone stay with you the first 24 hours after your procedure. For your comfort, please wear simple loose fitting clothing to the center. Please do not bring valuables. If you have a living will and a durable power of  for healthcare, please bring in a copy.     You will need to bring a photo ID and insurance card    Our goal is to provide you with excellent care so if you have any questions, please contact us at the [facility] at [phone]         Please note these are generalized instructions for all EGD cases, you may be provided with more specific instructions if necessary
never used

## 2025-08-21 ENCOUNTER — OFFICE VISIT (OUTPATIENT)
Dept: ORTHOPEDIC SURGERY | Age: 70
End: 2025-08-21

## 2025-08-21 VITALS — BODY MASS INDEX: 36.84 KG/M2 | WEIGHT: 272 LBS | HEIGHT: 72 IN

## 2025-08-21 DIAGNOSIS — M25.562 LEFT KNEE PAIN, UNSPECIFIED CHRONICITY: Primary | ICD-10-CM

## 2025-08-21 DIAGNOSIS — M65.962 SYNOVITIS OF LEFT KNEE: ICD-10-CM

## 2025-08-21 DIAGNOSIS — M22.42 CHONDROMALACIA OF LEFT PATELLA: ICD-10-CM

## 2025-08-21 DIAGNOSIS — M17.12 PRIMARY OSTEOARTHRITIS OF LEFT KNEE: ICD-10-CM

## 2025-08-21 RX ORDER — BETAMETHASONE SODIUM PHOSPHATE AND BETAMETHASONE ACETATE 3; 3 MG/ML; MG/ML
12 INJECTION, SUSPENSION INTRA-ARTICULAR; INTRALESIONAL; INTRAMUSCULAR; SOFT TISSUE ONCE
Status: COMPLETED | OUTPATIENT
Start: 2025-08-21 | End: 2025-08-21

## 2025-08-21 RX ORDER — BUPIVACAINE HYDROCHLORIDE 2.5 MG/ML
2 INJECTION, SOLUTION INFILTRATION; PERINEURAL ONCE
Status: COMPLETED | OUTPATIENT
Start: 2025-08-21 | End: 2025-08-21

## 2025-08-21 RX ORDER — CELECOXIB 200 MG/1
200 CAPSULE ORAL DAILY
Qty: 30 CAPSULE | Refills: 3 | Status: SHIPPED | OUTPATIENT
Start: 2025-08-21

## 2025-08-21 RX ADMIN — Medication 1 ML: at 09:43

## 2025-08-21 RX ADMIN — BETAMETHASONE SODIUM PHOSPHATE AND BETAMETHASONE ACETATE 12 MG: 3; 3 INJECTION, SUSPENSION INTRA-ARTICULAR; INTRALESIONAL; INTRAMUSCULAR; SOFT TISSUE at 09:40

## 2025-08-21 RX ADMIN — BUPIVACAINE HYDROCHLORIDE 5 MG: 2.5 INJECTION, SOLUTION INFILTRATION; PERINEURAL at 09:41

## 2025-08-26 ENCOUNTER — HOSPITAL ENCOUNTER (OUTPATIENT)
Dept: PHYSICAL THERAPY | Age: 70
Setting detail: THERAPIES SERIES
Discharge: HOME | End: 2025-08-26
Payer: MEDICARE

## 2025-08-26 PROCEDURE — 97112 NEUROMUSCULAR REEDUCATION: CPT

## 2025-08-26 PROCEDURE — 97161 PT EVAL LOW COMPLEX 20 MIN: CPT

## 2025-08-26 PROCEDURE — 97110 THERAPEUTIC EXERCISES: CPT

## 2025-09-05 ENCOUNTER — HOSPITAL ENCOUNTER (OUTPATIENT)
Dept: PHYSICAL THERAPY | Age: 70
Setting detail: THERAPIES SERIES
Discharge: HOME | End: 2025-09-05
Payer: MEDICARE

## 2025-09-05 PROCEDURE — 97110 THERAPEUTIC EXERCISES: CPT

## 2025-09-05 PROCEDURE — 97530 THERAPEUTIC ACTIVITIES: CPT

## (undated) DEVICE — FORCEPS BX 240CM 2.4MM L NDL RAD JAW 4 M00513334